# Patient Record
Sex: FEMALE | Race: WHITE | NOT HISPANIC OR LATINO | Employment: OTHER | URBAN - METROPOLITAN AREA
[De-identification: names, ages, dates, MRNs, and addresses within clinical notes are randomized per-mention and may not be internally consistent; named-entity substitution may affect disease eponyms.]

---

## 2018-09-19 ENCOUNTER — HISTORICAL (OUTPATIENT)
Dept: RADIATION THERAPY | Facility: HOSPITAL | Age: 63
End: 2018-09-19

## 2018-09-24 ENCOUNTER — HISTORICAL (OUTPATIENT)
Dept: RADIATION THERAPY | Facility: HOSPITAL | Age: 63
End: 2018-09-24

## 2018-09-25 ENCOUNTER — HISTORICAL (OUTPATIENT)
Dept: ADMINISTRATIVE | Facility: HOSPITAL | Age: 63
End: 2018-09-25

## 2018-09-25 LAB
ABS NEUT (OLG): 5 X10(3)/MCL (ref 2.1–9.2)
ALBUMIN SERPL-MCNC: 3.6 GM/DL (ref 3.4–5)
ALBUMIN/GLOB SERPL: 1 RATIO (ref 1–2)
ALP SERPL-CCNC: 95 UNIT/L (ref 45–117)
ALT SERPL-CCNC: 71 UNIT/L (ref 12–78)
AST SERPL-CCNC: 83 UNIT/L (ref 15–37)
BASOPHILS # BLD AUTO: 0.06 X10(3)/MCL
BASOPHILS NFR BLD AUTO: 1 %
BILIRUB SERPL-MCNC: 0.4 MG/DL (ref 0.2–1)
BILIRUBIN DIRECT+TOT PNL SERPL-MCNC: 0.1 MG/DL
BILIRUBIN DIRECT+TOT PNL SERPL-MCNC: 0.3 MG/DL
BUN SERPL-MCNC: 8 MG/DL (ref 7–18)
CALCIUM SERPL-MCNC: 9.7 MG/DL (ref 8.5–10.1)
CHLORIDE SERPL-SCNC: 104 MMOL/L (ref 98–107)
CO2 SERPL-SCNC: 25 MMOL/L (ref 21–32)
CREAT SERPL-MCNC: 0.9 MG/DL (ref 0.6–1.3)
EOSINOPHIL # BLD AUTO: 0.28 10*3/UL
EOSINOPHIL NFR BLD AUTO: 3 %
ERYTHROCYTE [DISTWIDTH] IN BLOOD BY AUTOMATED COUNT: 12.9 % (ref 11.5–14.5)
EST. AVERAGE GLUCOSE BLD GHB EST-MCNC: 154 MG/DL
GLOBULIN SER-MCNC: 4.5 GM/ML (ref 2.3–3.5)
GLUCOSE SERPL-MCNC: 209 MG/DL (ref 74–106)
HBA1C MFR BLD: 7 % (ref 4.2–6.3)
HCT VFR BLD AUTO: 41.4 % (ref 35–46)
HGB BLD-MCNC: 13.2 GM/DL (ref 12–16)
IMM GRANULOCYTES # BLD AUTO: 0.03 10*3/UL
IMM GRANULOCYTES NFR BLD AUTO: 0 %
LYMPHOCYTES # BLD AUTO: 3.16 X10(3)/MCL
LYMPHOCYTES NFR BLD AUTO: 34 % (ref 13–40)
MCH RBC QN AUTO: 30.1 PG (ref 26–34)
MCHC RBC AUTO-ENTMCNC: 31.9 GM/DL (ref 31–37)
MCV RBC AUTO: 94.3 FL (ref 80–100)
MONOCYTES # BLD AUTO: 0.71 X10(3)/MCL
MONOCYTES NFR BLD AUTO: 8 % (ref 4–12)
NEUTROPHILS # BLD AUTO: 5 X10(3)/MCL
NEUTROPHILS NFR BLD AUTO: 54 X10(3)/MCL
PLATELET # BLD AUTO: 216 X10(3)/MCL (ref 130–400)
PMV BLD AUTO: 10.8 FL (ref 7.4–10.4)
POTASSIUM SERPL-SCNC: 4 MMOL/L (ref 3.5–5.1)
PROT SERPL-MCNC: 8.1 GM/DL (ref 6.4–8.2)
RBC # BLD AUTO: 4.39 X10(6)/MCL (ref 4–5.2)
SODIUM SERPL-SCNC: 140 MMOL/L (ref 136–145)
WBC # SPEC AUTO: 9.2 X10(3)/MCL (ref 4.5–11)

## 2018-10-18 ENCOUNTER — HISTORICAL (OUTPATIENT)
Dept: RADIOLOGY | Facility: HOSPITAL | Age: 63
End: 2018-10-18

## 2018-10-23 ENCOUNTER — HISTORICAL (OUTPATIENT)
Dept: RADIATION THERAPY | Facility: HOSPITAL | Age: 63
End: 2018-10-23

## 2018-10-26 ENCOUNTER — HISTORICAL (OUTPATIENT)
Dept: INFUSION THERAPY | Facility: HOSPITAL | Age: 63
End: 2018-10-26

## 2018-10-26 LAB
ABS NEUT (OLG): 4.96 X10(3)/MCL (ref 2.1–9.2)
ALBUMIN SERPL-MCNC: 3.8 GM/DL (ref 3.4–5)
ALBUMIN/GLOB SERPL: 1 RATIO (ref 1–2)
ALP SERPL-CCNC: 89 UNIT/L (ref 45–117)
ALT SERPL-CCNC: 44 UNIT/L (ref 12–78)
AST SERPL-CCNC: 40 UNIT/L (ref 15–37)
BASOPHILS # BLD AUTO: 0.06 X10(3)/MCL
BASOPHILS NFR BLD AUTO: 1 %
BILIRUB SERPL-MCNC: 0.4 MG/DL (ref 0.2–1)
BILIRUBIN DIRECT+TOT PNL SERPL-MCNC: 0.2 MG/DL
BILIRUBIN DIRECT+TOT PNL SERPL-MCNC: 0.2 MG/DL
BUN SERPL-MCNC: 8 MG/DL (ref 7–18)
CALCIUM SERPL-MCNC: 9.9 MG/DL (ref 8.5–10.1)
CHLORIDE SERPL-SCNC: 102 MMOL/L (ref 98–107)
CO2 SERPL-SCNC: 22 MMOL/L (ref 21–32)
CREAT SERPL-MCNC: 0.8 MG/DL (ref 0.6–1.3)
EOSINOPHIL # BLD AUTO: 0.23 X10(3)/MCL
EOSINOPHIL NFR BLD AUTO: 2 %
ERYTHROCYTE [DISTWIDTH] IN BLOOD BY AUTOMATED COUNT: 13.2 % (ref 11.5–14.5)
GLOBULIN SER-MCNC: 4.3 GM/ML (ref 2.3–3.5)
GLUCOSE SERPL-MCNC: 179 MG/DL (ref 74–106)
HCT VFR BLD AUTO: 40 % (ref 35–46)
HGB BLD-MCNC: 12.9 GM/DL (ref 12–16)
IMM GRANULOCYTES # BLD AUTO: 0.05 10*3/UL
IMM GRANULOCYTES NFR BLD AUTO: 0 %
LYMPHOCYTES # BLD AUTO: 4.42 X10(3)/MCL
LYMPHOCYTES NFR BLD AUTO: 42 % (ref 13–40)
MCH RBC QN AUTO: 29.5 PG (ref 26–34)
MCHC RBC AUTO-ENTMCNC: 32.3 GM/DL (ref 31–37)
MCV RBC AUTO: 91.5 FL (ref 80–100)
MONOCYTES # BLD AUTO: 0.92 X10(3)/MCL
MONOCYTES NFR BLD AUTO: 9 % (ref 4–12)
NEUTROPHILS # BLD AUTO: 4.96 X10(3)/MCL
NEUTROPHILS NFR BLD AUTO: 47 X10(3)/MCL
PLATELET # BLD AUTO: 270 X10(3)/MCL (ref 130–400)
PMV BLD AUTO: 10.2 FL (ref 7.4–10.4)
POTASSIUM SERPL-SCNC: 3.6 MMOL/L (ref 3.5–5.1)
PROT SERPL-MCNC: 8.1 GM/DL (ref 6.4–8.2)
RBC # BLD AUTO: 4.37 X10(6)/MCL (ref 4–5.2)
SODIUM SERPL-SCNC: 138 MMOL/L (ref 136–145)
WBC # SPEC AUTO: 10.6 X10(3)/MCL (ref 4.5–11)

## 2018-10-31 ENCOUNTER — HISTORICAL (OUTPATIENT)
Dept: RADIATION THERAPY | Facility: HOSPITAL | Age: 63
End: 2018-10-31

## 2018-11-05 ENCOUNTER — HISTORICAL (OUTPATIENT)
Dept: HEMATOLOGY/ONCOLOGY | Facility: CLINIC | Age: 63
End: 2018-11-05

## 2018-11-06 ENCOUNTER — HISTORICAL (OUTPATIENT)
Dept: INFUSION THERAPY | Facility: HOSPITAL | Age: 63
End: 2018-11-06

## 2018-11-06 ENCOUNTER — HISTORICAL (OUTPATIENT)
Dept: RADIATION THERAPY | Facility: HOSPITAL | Age: 63
End: 2018-11-06

## 2018-11-06 LAB
ABS NEUT (OLG): 6.33 X10(3)/MCL (ref 2.1–9.2)
BASOPHILS # BLD AUTO: 0.06 X10(3)/MCL
BASOPHILS NFR BLD AUTO: 1 %
BUN SERPL-MCNC: 8 MG/DL (ref 7–18)
CALCIUM SERPL-MCNC: 9.3 MG/DL (ref 8.5–10.1)
CHLORIDE SERPL-SCNC: 102 MMOL/L (ref 98–107)
CO2 SERPL-SCNC: 23 MMOL/L (ref 21–32)
CREAT SERPL-MCNC: 0.9 MG/DL (ref 0.6–1.3)
CREAT/UREA NIT SERPL: 9
EOSINOPHIL # BLD AUTO: 0.24 X10(3)/MCL
EOSINOPHIL NFR BLD AUTO: 2 %
ERYTHROCYTE [DISTWIDTH] IN BLOOD BY AUTOMATED COUNT: 13.3 % (ref 11.5–14.5)
GLUCOSE SERPL-MCNC: 233 MG/DL (ref 74–106)
HCT VFR BLD AUTO: 38.5 % (ref 35–46)
HGB BLD-MCNC: 12.2 GM/DL (ref 12–16)
IMM GRANULOCYTES # BLD AUTO: 0.02 10*3/UL
IMM GRANULOCYTES NFR BLD AUTO: 0 %
LYMPHOCYTES # BLD AUTO: 3.39 X10(3)/MCL
LYMPHOCYTES NFR BLD AUTO: 32 % (ref 13–40)
MAGNESIUM SERPL-MCNC: 2 MG/DL (ref 1.8–2.4)
MCH RBC QN AUTO: 29.5 PG (ref 26–34)
MCHC RBC AUTO-ENTMCNC: 31.7 GM/DL (ref 31–37)
MCV RBC AUTO: 93.2 FL (ref 80–100)
MONOCYTES # BLD AUTO: 0.72 X10(3)/MCL
MONOCYTES NFR BLD AUTO: 7 % (ref 4–12)
NEUTROPHILS # BLD AUTO: 6.33 X10(3)/MCL
NEUTROPHILS NFR BLD AUTO: 59 X10(3)/MCL
PLATELET # BLD AUTO: 213 X10(3)/MCL (ref 130–400)
PMV BLD AUTO: 10.4 FL (ref 7.4–10.4)
POTASSIUM SERPL-SCNC: 3.5 MMOL/L (ref 3.5–5.1)
RBC # BLD AUTO: 4.13 X10(6)/MCL (ref 4–5.2)
SODIUM SERPL-SCNC: 135 MMOL/L (ref 136–145)
WBC # SPEC AUTO: 10.8 X10(3)/MCL (ref 4.5–11)

## 2018-11-07 ENCOUNTER — HISTORICAL (OUTPATIENT)
Dept: RADIATION THERAPY | Facility: HOSPITAL | Age: 63
End: 2018-11-07

## 2018-11-08 ENCOUNTER — HISTORICAL (OUTPATIENT)
Dept: RADIATION THERAPY | Facility: HOSPITAL | Age: 63
End: 2018-11-08

## 2018-11-09 ENCOUNTER — HISTORICAL (OUTPATIENT)
Dept: RADIATION THERAPY | Facility: HOSPITAL | Age: 63
End: 2018-11-09

## 2018-11-12 ENCOUNTER — HISTORICAL (OUTPATIENT)
Dept: RADIATION THERAPY | Facility: HOSPITAL | Age: 63
End: 2018-11-12

## 2018-11-13 ENCOUNTER — HISTORICAL (OUTPATIENT)
Dept: INFUSION THERAPY | Facility: HOSPITAL | Age: 63
End: 2018-11-13

## 2018-11-13 ENCOUNTER — HISTORICAL (OUTPATIENT)
Dept: RADIATION THERAPY | Facility: HOSPITAL | Age: 63
End: 2018-11-13

## 2018-11-13 LAB
ABS NEUT (OLG): 4.24 X10(3)/MCL (ref 2.1–9.2)
ALBUMIN SERPL-MCNC: 3.4 GM/DL (ref 3.4–5)
ALBUMIN/GLOB SERPL: 1 RATIO (ref 1–2)
ALP SERPL-CCNC: 81 UNIT/L (ref 45–117)
ALT SERPL-CCNC: 83 UNIT/L (ref 12–78)
AST SERPL-CCNC: 96 UNIT/L (ref 15–37)
BASOPHILS # BLD AUTO: 0.05 X10(3)/MCL
BASOPHILS NFR BLD AUTO: 1 %
BILIRUB SERPL-MCNC: 0.3 MG/DL (ref 0.2–1)
BILIRUBIN DIRECT+TOT PNL SERPL-MCNC: 0.1 MG/DL
BILIRUBIN DIRECT+TOT PNL SERPL-MCNC: 0.2 MG/DL
BUN SERPL-MCNC: 9 MG/DL (ref 7–18)
CALCIUM SERPL-MCNC: 9.2 MG/DL (ref 8.5–10.1)
CHLORIDE SERPL-SCNC: 104 MMOL/L (ref 98–107)
CO2 SERPL-SCNC: 26 MMOL/L (ref 21–32)
CREAT SERPL-MCNC: 0.7 MG/DL (ref 0.6–1.3)
EOSINOPHIL # BLD AUTO: 0.23 X10(3)/MCL
EOSINOPHIL NFR BLD AUTO: 3 %
ERYTHROCYTE [DISTWIDTH] IN BLOOD BY AUTOMATED COUNT: 13.2 % (ref 11.5–14.5)
GLOBULIN SER-MCNC: 4.1 GM/ML (ref 2.3–3.5)
GLUCOSE SERPL-MCNC: 193 MG/DL (ref 74–106)
HCT VFR BLD AUTO: 37.4 % (ref 35–46)
HGB BLD-MCNC: 12 GM/DL (ref 12–16)
IMM GRANULOCYTES # BLD AUTO: 0.01 10*3/UL
IMM GRANULOCYTES NFR BLD AUTO: 0 %
LYMPHOCYTES # BLD AUTO: 1.71 X10(3)/MCL
LYMPHOCYTES NFR BLD AUTO: 25 % (ref 13–40)
MAGNESIUM SERPL-MCNC: 1.6 MG/DL (ref 1.8–2.4)
MCH RBC QN AUTO: 29.3 PG (ref 26–34)
MCHC RBC AUTO-ENTMCNC: 32.1 GM/DL (ref 31–37)
MCV RBC AUTO: 91.2 FL (ref 80–100)
MONOCYTES # BLD AUTO: 0.63 X10(3)/MCL
MONOCYTES NFR BLD AUTO: 9 % (ref 4–12)
NEUTROPHILS # BLD AUTO: 4.24 X10(3)/MCL
NEUTROPHILS NFR BLD AUTO: 62 X10(3)/MCL
PLATELET # BLD AUTO: 221 X10(3)/MCL (ref 130–400)
PMV BLD AUTO: 10.8 FL (ref 7.4–10.4)
POTASSIUM SERPL-SCNC: 3.8 MMOL/L (ref 3.5–5.1)
PROT SERPL-MCNC: 7.5 GM/DL (ref 6.4–8.2)
RBC # BLD AUTO: 4.1 X10(6)/MCL (ref 4–5.2)
SODIUM SERPL-SCNC: 139 MMOL/L (ref 136–145)
WBC # SPEC AUTO: 6.9 X10(3)/MCL (ref 4.5–11)

## 2018-11-14 ENCOUNTER — HISTORICAL (OUTPATIENT)
Dept: RADIATION THERAPY | Facility: HOSPITAL | Age: 63
End: 2018-11-14

## 2018-11-15 ENCOUNTER — HISTORICAL (OUTPATIENT)
Dept: RADIATION THERAPY | Facility: HOSPITAL | Age: 63
End: 2018-11-15

## 2018-11-15 ENCOUNTER — HISTORICAL (OUTPATIENT)
Dept: INFUSION THERAPY | Facility: HOSPITAL | Age: 63
End: 2018-11-15

## 2018-11-16 ENCOUNTER — HISTORICAL (OUTPATIENT)
Dept: RADIATION THERAPY | Facility: HOSPITAL | Age: 63
End: 2018-11-16

## 2018-11-18 ENCOUNTER — HISTORICAL (OUTPATIENT)
Dept: RADIATION THERAPY | Facility: HOSPITAL | Age: 63
End: 2018-11-18

## 2018-11-19 ENCOUNTER — HISTORICAL (OUTPATIENT)
Dept: RADIATION THERAPY | Facility: HOSPITAL | Age: 63
End: 2018-11-19

## 2018-11-19 ENCOUNTER — HISTORICAL (OUTPATIENT)
Dept: ADMINISTRATIVE | Facility: HOSPITAL | Age: 63
End: 2018-11-19

## 2018-11-19 LAB
ABS NEUT (OLG): 5.23 X10(3)/MCL (ref 2.1–9.2)
BASOPHILS # BLD AUTO: 0.03 X10(3)/MCL
BASOPHILS NFR BLD AUTO: 0 %
BUN SERPL-MCNC: 8 MG/DL (ref 7–18)
CALCIUM SERPL-MCNC: 9.4 MG/DL (ref 8.5–10.1)
CHLORIDE SERPL-SCNC: 103 MMOL/L (ref 98–107)
CO2 SERPL-SCNC: 26 MMOL/L (ref 21–32)
CREAT SERPL-MCNC: 0.7 MG/DL (ref 0.6–1.3)
CREAT/UREA NIT SERPL: 11
EOSINOPHIL # BLD AUTO: 0.15 X10(3)/MCL
EOSINOPHIL NFR BLD AUTO: 2 %
ERYTHROCYTE [DISTWIDTH] IN BLOOD BY AUTOMATED COUNT: 13.3 % (ref 11.5–14.5)
GLUCOSE SERPL-MCNC: 152 MG/DL (ref 74–106)
HCT VFR BLD AUTO: 36.2 % (ref 35–46)
HGB BLD-MCNC: 11.6 GM/DL (ref 12–16)
IMM GRANULOCYTES # BLD AUTO: 0.02 10*3/UL
IMM GRANULOCYTES NFR BLD AUTO: 0 %
LYMPHOCYTES # BLD AUTO: 1.85 X10(3)/MCL
LYMPHOCYTES NFR BLD AUTO: 23 % (ref 13–40)
MAGNESIUM SERPL-MCNC: 1.8 MG/DL (ref 1.8–2.4)
MCH RBC QN AUTO: 29.1 PG (ref 26–34)
MCHC RBC AUTO-ENTMCNC: 32 GM/DL (ref 31–37)
MCV RBC AUTO: 90.7 FL (ref 80–100)
MONOCYTES # BLD AUTO: 0.81 X10(3)/MCL
MONOCYTES NFR BLD AUTO: 10 % (ref 4–12)
NEUTROPHILS # BLD AUTO: 5.23 X10(3)/MCL
NEUTROPHILS NFR BLD AUTO: 65 X10(3)/MCL
PLATELET # BLD AUTO: 197 X10(3)/MCL (ref 130–400)
PMV BLD AUTO: 9.8 FL (ref 7.4–10.4)
POTASSIUM SERPL-SCNC: 3.6 MMOL/L (ref 3.5–5.1)
RBC # BLD AUTO: 3.99 X10(6)/MCL (ref 4–5.2)
SODIUM SERPL-SCNC: 139 MMOL/L (ref 136–145)
WBC # SPEC AUTO: 8.1 X10(3)/MCL (ref 4.5–11)

## 2018-11-20 ENCOUNTER — HISTORICAL (OUTPATIENT)
Dept: RADIATION THERAPY | Facility: HOSPITAL | Age: 63
End: 2018-11-20

## 2018-11-20 ENCOUNTER — HISTORICAL (OUTPATIENT)
Dept: INFUSION THERAPY | Facility: HOSPITAL | Age: 63
End: 2018-11-20

## 2018-11-21 ENCOUNTER — HISTORICAL (OUTPATIENT)
Dept: RADIATION THERAPY | Facility: HOSPITAL | Age: 63
End: 2018-11-21

## 2018-11-26 ENCOUNTER — HISTORICAL (OUTPATIENT)
Dept: RADIATION THERAPY | Facility: HOSPITAL | Age: 63
End: 2018-11-26

## 2018-11-27 ENCOUNTER — HISTORICAL (OUTPATIENT)
Dept: INFUSION THERAPY | Facility: HOSPITAL | Age: 63
End: 2018-11-27

## 2018-11-27 ENCOUNTER — HISTORICAL (OUTPATIENT)
Dept: RADIATION THERAPY | Facility: HOSPITAL | Age: 63
End: 2018-11-27

## 2018-11-27 LAB
ABS NEUT (OLG): 6.84 X10(3)/MCL (ref 2.1–9.2)
ALBUMIN SERPL-MCNC: 3.6 GM/DL (ref 3.4–5)
ALBUMIN/GLOB SERPL: 1 RATIO (ref 1–2)
ALP SERPL-CCNC: 90 UNIT/L (ref 45–117)
ALT SERPL-CCNC: 59 UNIT/L (ref 12–78)
AST SERPL-CCNC: 59 UNIT/L (ref 15–37)
BASOPHILS # BLD AUTO: 0.06 X10(3)/MCL
BASOPHILS NFR BLD AUTO: 1 %
BILIRUB SERPL-MCNC: 0.5 MG/DL (ref 0.2–1)
BILIRUBIN DIRECT+TOT PNL SERPL-MCNC: 0.2 MG/DL
BILIRUBIN DIRECT+TOT PNL SERPL-MCNC: 0.3 MG/DL
BUN SERPL-MCNC: 7 MG/DL (ref 7–18)
CALCIUM SERPL-MCNC: 9.6 MG/DL (ref 8.5–10.1)
CHLORIDE SERPL-SCNC: 102 MMOL/L (ref 98–107)
CO2 SERPL-SCNC: 28 MMOL/L (ref 21–32)
CREAT SERPL-MCNC: 0.7 MG/DL (ref 0.6–1.3)
EOSINOPHIL # BLD AUTO: 0.32 10*3/UL
EOSINOPHIL NFR BLD AUTO: 3 %
ERYTHROCYTE [DISTWIDTH] IN BLOOD BY AUTOMATED COUNT: 13.4 % (ref 11.5–14.5)
GLOBULIN SER-MCNC: 4.3 GM/ML (ref 2.3–3.5)
GLUCOSE SERPL-MCNC: 169 MG/DL (ref 74–106)
HCT VFR BLD AUTO: 41.5 % (ref 35–46)
HGB BLD-MCNC: 13 GM/DL (ref 12–16)
IMM GRANULOCYTES # BLD AUTO: 0.03 10*3/UL
IMM GRANULOCYTES NFR BLD AUTO: 0 %
LYMPHOCYTES # BLD AUTO: 1.67 X10(3)/MCL
LYMPHOCYTES NFR BLD AUTO: 17 % (ref 13–40)
MAGNESIUM SERPL-MCNC: 1.8 MG/DL (ref 1.8–2.4)
MCH RBC QN AUTO: 29.1 PG (ref 26–34)
MCHC RBC AUTO-ENTMCNC: 31.3 GM/DL (ref 31–37)
MCV RBC AUTO: 92.8 FL (ref 80–100)
MONOCYTES # BLD AUTO: 0.81 X10(3)/MCL
MONOCYTES NFR BLD AUTO: 8 % (ref 4–12)
NEUTROPHILS # BLD AUTO: 6.84 X10(3)/MCL
NEUTROPHILS NFR BLD AUTO: 70 X10(3)/MCL
PLATELET # BLD AUTO: 253 X10(3)/MCL (ref 130–400)
PMV BLD AUTO: 10.1 FL (ref 7.4–10.4)
POTASSIUM SERPL-SCNC: 4 MMOL/L (ref 3.5–5.1)
PROT SERPL-MCNC: 7.9 GM/DL (ref 6.4–8.2)
RBC # BLD AUTO: 4.47 X10(6)/MCL (ref 4–5.2)
SODIUM SERPL-SCNC: 138 MMOL/L (ref 136–145)
WBC # SPEC AUTO: 9.7 X10(3)/MCL (ref 4.5–11)

## 2018-11-28 ENCOUNTER — HISTORICAL (OUTPATIENT)
Dept: RADIATION THERAPY | Facility: HOSPITAL | Age: 63
End: 2018-11-28

## 2018-11-29 ENCOUNTER — HISTORICAL (OUTPATIENT)
Dept: RADIATION THERAPY | Facility: HOSPITAL | Age: 63
End: 2018-11-29

## 2018-11-30 ENCOUNTER — HISTORICAL (OUTPATIENT)
Dept: RADIATION THERAPY | Facility: HOSPITAL | Age: 63
End: 2018-11-30

## 2018-12-03 ENCOUNTER — HISTORICAL (OUTPATIENT)
Dept: RADIATION THERAPY | Facility: HOSPITAL | Age: 63
End: 2018-12-03

## 2018-12-04 ENCOUNTER — HISTORICAL (OUTPATIENT)
Dept: RADIATION THERAPY | Facility: HOSPITAL | Age: 63
End: 2018-12-04

## 2018-12-04 ENCOUNTER — HISTORICAL (OUTPATIENT)
Dept: INFUSION THERAPY | Facility: HOSPITAL | Age: 63
End: 2018-12-04

## 2018-12-04 LAB
ABS NEUT (OLG): 7.68 X10(3)/MCL (ref 2.1–9.2)
ALBUMIN SERPL-MCNC: 3.7 GM/DL (ref 3.4–5)
ALBUMIN/GLOB SERPL: 1 RATIO (ref 1–2)
ALP SERPL-CCNC: 89 UNIT/L (ref 45–117)
ALT SERPL-CCNC: 46 UNIT/L (ref 12–78)
AST SERPL-CCNC: 34 UNIT/L (ref 15–37)
BASOPHILS # BLD AUTO: 0.04 X10(3)/MCL
BASOPHILS NFR BLD AUTO: 0 %
BILIRUB SERPL-MCNC: 0.5 MG/DL (ref 0.2–1)
BILIRUBIN DIRECT+TOT PNL SERPL-MCNC: 0.2 MG/DL
BILIRUBIN DIRECT+TOT PNL SERPL-MCNC: 0.3 MG/DL
BUN SERPL-MCNC: 16 MG/DL (ref 7–18)
CALCIUM SERPL-MCNC: 9.7 MG/DL (ref 8.5–10.1)
CHLORIDE SERPL-SCNC: 102 MMOL/L (ref 98–107)
CO2 SERPL-SCNC: 25 MMOL/L (ref 21–32)
CREAT SERPL-MCNC: 0.9 MG/DL (ref 0.6–1.3)
EOSINOPHIL # BLD AUTO: 0.08 10*3/UL
EOSINOPHIL NFR BLD AUTO: 1 %
ERYTHROCYTE [DISTWIDTH] IN BLOOD BY AUTOMATED COUNT: 13.2 % (ref 11.5–14.5)
GLOBULIN SER-MCNC: 4.7 GM/ML (ref 2.3–3.5)
GLUCOSE SERPL-MCNC: 223 MG/DL (ref 74–106)
HCT VFR BLD AUTO: 39.5 % (ref 35–46)
HGB BLD-MCNC: 12.8 GM/DL (ref 12–16)
IMM GRANULOCYTES # BLD AUTO: 0.04 10*3/UL
IMM GRANULOCYTES NFR BLD AUTO: 0 %
LYMPHOCYTES # BLD AUTO: 0.63 X10(3)/MCL
LYMPHOCYTES NFR BLD AUTO: 7 % (ref 13–40)
MAGNESIUM SERPL-MCNC: 1.8 MG/DL (ref 1.8–2.4)
MCH RBC QN AUTO: 29.6 PG (ref 26–34)
MCHC RBC AUTO-ENTMCNC: 32.4 GM/DL (ref 31–37)
MCV RBC AUTO: 91.2 FL (ref 80–100)
MONOCYTES # BLD AUTO: 0.59 X10(3)/MCL
MONOCYTES NFR BLD AUTO: 6 % (ref 4–12)
NEUTROPHILS # BLD AUTO: 7.68 X10(3)/MCL
NEUTROPHILS NFR BLD AUTO: 85 X10(3)/MCL
PLATELET # BLD AUTO: 204 X10(3)/MCL (ref 130–400)
PMV BLD AUTO: 10.3 FL (ref 7.4–10.4)
POTASSIUM SERPL-SCNC: 3.8 MMOL/L (ref 3.5–5.1)
PROT SERPL-MCNC: 8.4 GM/DL (ref 6.4–8.2)
RBC # BLD AUTO: 4.33 X10(6)/MCL (ref 4–5.2)
SODIUM SERPL-SCNC: 137 MMOL/L (ref 136–145)
WBC # SPEC AUTO: 9.1 X10(3)/MCL (ref 4.5–11)

## 2018-12-05 ENCOUNTER — HISTORICAL (OUTPATIENT)
Dept: RADIATION THERAPY | Facility: HOSPITAL | Age: 63
End: 2018-12-05

## 2018-12-06 ENCOUNTER — HISTORICAL (OUTPATIENT)
Dept: RADIATION THERAPY | Facility: HOSPITAL | Age: 63
End: 2018-12-06

## 2018-12-07 ENCOUNTER — HISTORICAL (OUTPATIENT)
Dept: RADIATION THERAPY | Facility: HOSPITAL | Age: 63
End: 2018-12-07

## 2018-12-10 ENCOUNTER — HISTORICAL (OUTPATIENT)
Dept: RADIATION THERAPY | Facility: HOSPITAL | Age: 63
End: 2018-12-10

## 2018-12-11 ENCOUNTER — HISTORICAL (OUTPATIENT)
Dept: RADIATION THERAPY | Facility: HOSPITAL | Age: 63
End: 2018-12-11

## 2018-12-11 ENCOUNTER — HISTORICAL (OUTPATIENT)
Dept: INFUSION THERAPY | Facility: HOSPITAL | Age: 63
End: 2018-12-11

## 2018-12-11 LAB
ABS NEUT (OLG): 7.24 X10(3)/MCL (ref 2.1–9.2)
ALBUMIN SERPL-MCNC: 3.2 GM/DL (ref 3.4–5)
ALBUMIN/GLOB SERPL: 1 RATIO (ref 1–2)
ALP SERPL-CCNC: 86 UNIT/L (ref 45–117)
ALT SERPL-CCNC: 32 UNIT/L (ref 12–78)
AST SERPL-CCNC: 29 UNIT/L (ref 15–37)
BASOPHILS # BLD AUTO: 0.04 X10(3)/MCL
BASOPHILS NFR BLD AUTO: 0 %
BILIRUB SERPL-MCNC: 0.5 MG/DL (ref 0.2–1)
BILIRUBIN DIRECT+TOT PNL SERPL-MCNC: 0.2 MG/DL
BILIRUBIN DIRECT+TOT PNL SERPL-MCNC: 0.3 MG/DL
BUN SERPL-MCNC: 10 MG/DL (ref 7–18)
CALCIUM SERPL-MCNC: 8.7 MG/DL (ref 8.5–10.1)
CHLORIDE SERPL-SCNC: 102 MMOL/L (ref 98–107)
CO2 SERPL-SCNC: 26 MMOL/L (ref 21–32)
CREAT SERPL-MCNC: 2 MG/DL (ref 0.6–1.3)
EOSINOPHIL # BLD AUTO: 0.16 X10(3)/MCL
EOSINOPHIL NFR BLD AUTO: 2 %
ERYTHROCYTE [DISTWIDTH] IN BLOOD BY AUTOMATED COUNT: 13.8 % (ref 11.5–14.5)
GLOBULIN SER-MCNC: 4.4 GM/ML (ref 2.3–3.5)
GLUCOSE SERPL-MCNC: 186 MG/DL (ref 74–106)
HCT VFR BLD AUTO: 38.8 % (ref 35–46)
HGB BLD-MCNC: 12.2 GM/DL (ref 12–16)
IMM GRANULOCYTES # BLD AUTO: 0.03 10*3/UL
IMM GRANULOCYTES NFR BLD AUTO: 0 %
LYMPHOCYTES # BLD AUTO: 0.53 X10(3)/MCL
LYMPHOCYTES NFR BLD AUTO: 6 % (ref 13–40)
MAGNESIUM SERPL-MCNC: 1.6 MG/DL (ref 1.8–2.4)
MCH RBC QN AUTO: 28.8 PG (ref 26–34)
MCHC RBC AUTO-ENTMCNC: 31.4 GM/DL (ref 31–37)
MCV RBC AUTO: 91.7 FL (ref 80–100)
MONOCYTES # BLD AUTO: 0.85 X10(3)/MCL
MONOCYTES NFR BLD AUTO: 10 % (ref 4–12)
NEUTROPHILS # BLD AUTO: 7.24 X10(3)/MCL
NEUTROPHILS NFR BLD AUTO: 82 X10(3)/MCL
PLATELET # BLD AUTO: 231 X10(3)/MCL (ref 130–400)
PMV BLD AUTO: 9.9 FL (ref 7.4–10.4)
POTASSIUM SERPL-SCNC: 3.4 MMOL/L (ref 3.5–5.1)
PROT SERPL-MCNC: 7.6 GM/DL (ref 6.4–8.2)
RBC # BLD AUTO: 4.23 X10(6)/MCL (ref 4–5.2)
SODIUM SERPL-SCNC: 138 MMOL/L (ref 136–145)
WBC # SPEC AUTO: 8.8 X10(3)/MCL (ref 4.5–11)

## 2018-12-12 ENCOUNTER — HISTORICAL (OUTPATIENT)
Dept: RADIATION THERAPY | Facility: HOSPITAL | Age: 63
End: 2018-12-12

## 2018-12-12 ENCOUNTER — HISTORICAL (OUTPATIENT)
Dept: INFUSION THERAPY | Facility: HOSPITAL | Age: 63
End: 2018-12-12

## 2018-12-13 ENCOUNTER — HISTORICAL (OUTPATIENT)
Dept: RADIATION THERAPY | Facility: HOSPITAL | Age: 63
End: 2018-12-13

## 2018-12-14 ENCOUNTER — HISTORICAL (OUTPATIENT)
Dept: RADIATION THERAPY | Facility: HOSPITAL | Age: 63
End: 2018-12-14

## 2018-12-17 ENCOUNTER — HISTORICAL (OUTPATIENT)
Dept: RADIATION THERAPY | Facility: HOSPITAL | Age: 63
End: 2018-12-17

## 2018-12-18 ENCOUNTER — HISTORICAL (OUTPATIENT)
Dept: RADIATION THERAPY | Facility: HOSPITAL | Age: 63
End: 2018-12-18

## 2018-12-19 ENCOUNTER — HISTORICAL (OUTPATIENT)
Dept: INFUSION THERAPY | Facility: HOSPITAL | Age: 63
End: 2018-12-19

## 2018-12-19 ENCOUNTER — HISTORICAL (OUTPATIENT)
Dept: RADIATION THERAPY | Facility: HOSPITAL | Age: 63
End: 2018-12-19

## 2018-12-19 LAB
ABS NEUT (OLG): 4.08 X10(3)/MCL (ref 2.1–9.2)
ALBUMIN SERPL-MCNC: 2.8 GM/DL (ref 3.4–5)
ALBUMIN/GLOB SERPL: 1 RATIO (ref 1–2)
ALP SERPL-CCNC: 84 UNIT/L (ref 45–117)
ALT SERPL-CCNC: 29 UNIT/L (ref 12–78)
AST SERPL-CCNC: 30 UNIT/L (ref 15–37)
BASOPHILS # BLD AUTO: 0.03 X10(3)/MCL
BASOPHILS NFR BLD AUTO: 0 %
BILIRUB SERPL-MCNC: 0.6 MG/DL (ref 0.2–1)
BILIRUBIN DIRECT+TOT PNL SERPL-MCNC: 0.3 MG/DL
BILIRUBIN DIRECT+TOT PNL SERPL-MCNC: 0.3 MG/DL
BUN SERPL-MCNC: 4 MG/DL (ref 7–18)
CALCIUM SERPL-MCNC: 7.9 MG/DL (ref 8.5–10.1)
CHLORIDE SERPL-SCNC: 99 MMOL/L (ref 98–107)
CO2 SERPL-SCNC: 30 MMOL/L (ref 21–32)
CREAT SERPL-MCNC: 1 MG/DL (ref 0.6–1.3)
EOSINOPHIL # BLD AUTO: 0.21 X10(3)/MCL
EOSINOPHIL NFR BLD AUTO: 4 %
ERYTHROCYTE [DISTWIDTH] IN BLOOD BY AUTOMATED COUNT: 13.7 % (ref 11.5–14.5)
GLOBULIN SER-MCNC: 4.4 GM/ML (ref 2.3–3.5)
GLUCOSE SERPL-MCNC: 204 MG/DL (ref 74–106)
HCT VFR BLD AUTO: 36.5 % (ref 35–46)
HGB BLD-MCNC: 11.6 GM/DL (ref 12–16)
IMM GRANULOCYTES # BLD AUTO: 0.02 10*3/UL
IMM GRANULOCYTES NFR BLD AUTO: 0 %
LYMPHOCYTES # BLD AUTO: 0.59 X10(3)/MCL
LYMPHOCYTES NFR BLD AUTO: 11 % (ref 13–40)
MAGNESIUM SERPL-MCNC: 1.4 MG/DL (ref 1.8–2.4)
MCH RBC QN AUTO: 28.8 PG (ref 26–34)
MCHC RBC AUTO-ENTMCNC: 31.8 GM/DL (ref 31–37)
MCV RBC AUTO: 90.6 FL (ref 80–100)
MONOCYTES # BLD AUTO: 0.53 X10(3)/MCL
MONOCYTES NFR BLD AUTO: 10 % (ref 4–12)
NEUTROPHILS # BLD AUTO: 4.08 X10(3)/MCL
NEUTROPHILS NFR BLD AUTO: 75 X10(3)/MCL
PLATELET # BLD AUTO: 244 X10(3)/MCL (ref 130–400)
PMV BLD AUTO: 9.6 FL (ref 7.4–10.4)
POTASSIUM SERPL-SCNC: 2.9 MMOL/L (ref 3.5–5.1)
PROT SERPL-MCNC: 7.2 GM/DL (ref 6.4–8.2)
RBC # BLD AUTO: 4.03 X10(6)/MCL (ref 4–5.2)
SODIUM SERPL-SCNC: 138 MMOL/L (ref 136–145)
WBC # SPEC AUTO: 5.5 X10(3)/MCL (ref 4.5–11)

## 2018-12-20 ENCOUNTER — HISTORICAL (OUTPATIENT)
Dept: RADIATION THERAPY | Facility: HOSPITAL | Age: 63
End: 2018-12-20

## 2018-12-21 ENCOUNTER — HISTORICAL (OUTPATIENT)
Dept: RADIATION THERAPY | Facility: HOSPITAL | Age: 63
End: 2018-12-21

## 2018-12-24 ENCOUNTER — HISTORICAL (OUTPATIENT)
Dept: INFUSION THERAPY | Facility: HOSPITAL | Age: 63
End: 2018-12-24

## 2018-12-24 LAB
ABS NEUT (OLG): 3.84 X10(3)/MCL (ref 2.1–9.2)
ALBUMIN SERPL-MCNC: 2.6 GM/DL (ref 3.4–5)
ALBUMIN/GLOB SERPL: 1 RATIO (ref 1–2)
ALP SERPL-CCNC: 84 UNIT/L (ref 45–117)
ALT SERPL-CCNC: 24 UNIT/L (ref 12–78)
AST SERPL-CCNC: 19 UNIT/L (ref 15–37)
BASOPHILS # BLD AUTO: 0.02 X10(3)/MCL
BASOPHILS NFR BLD AUTO: 0 %
BILIRUB SERPL-MCNC: 0.6 MG/DL (ref 0.2–1)
BILIRUBIN DIRECT+TOT PNL SERPL-MCNC: 0.3 MG/DL
BILIRUBIN DIRECT+TOT PNL SERPL-MCNC: 0.3 MG/DL
BUN SERPL-MCNC: 2 MG/DL (ref 7–18)
CALCIUM SERPL-MCNC: 8.2 MG/DL (ref 8.5–10.1)
CHLORIDE SERPL-SCNC: 101 MMOL/L (ref 98–107)
CO2 SERPL-SCNC: 29 MMOL/L (ref 21–32)
CREAT SERPL-MCNC: 0.7 MG/DL (ref 0.6–1.3)
EOSINOPHIL # BLD AUTO: 0.1 X10(3)/MCL
EOSINOPHIL NFR BLD AUTO: 2 %
ERYTHROCYTE [DISTWIDTH] IN BLOOD BY AUTOMATED COUNT: 13.7 % (ref 11.5–14.5)
GLOBULIN SER-MCNC: 4.5 GM/ML (ref 2.3–3.5)
GLUCOSE SERPL-MCNC: 211 MG/DL (ref 74–106)
HCT VFR BLD AUTO: 36.1 % (ref 35–46)
HGB BLD-MCNC: 11.6 GM/DL (ref 12–16)
IMM GRANULOCYTES # BLD AUTO: 0.02 10*3/UL
IMM GRANULOCYTES NFR BLD AUTO: 0 %
LYMPHOCYTES # BLD AUTO: 0.6 X10(3)/MCL
LYMPHOCYTES NFR BLD AUTO: 12 % (ref 13–40)
MAGNESIUM SERPL-MCNC: 1.4 MG/DL (ref 1.8–2.4)
MCH RBC QN AUTO: 28.4 PG (ref 26–34)
MCHC RBC AUTO-ENTMCNC: 32.1 GM/DL (ref 31–37)
MCV RBC AUTO: 88.3 FL (ref 80–100)
MONOCYTES # BLD AUTO: 0.57 X10(3)/MCL
MONOCYTES NFR BLD AUTO: 11 % (ref 4–12)
NEUTROPHILS # BLD AUTO: 3.84 X10(3)/MCL
NEUTROPHILS NFR BLD AUTO: 74 X10(3)/MCL
PLATELET # BLD AUTO: 249 X10(3)/MCL (ref 130–400)
PMV BLD AUTO: 9.6 FL (ref 7.4–10.4)
POTASSIUM SERPL-SCNC: 2.7 MMOL/L (ref 3.5–5.1)
PROT SERPL-MCNC: 7.1 GM/DL (ref 6.4–8.2)
RBC # BLD AUTO: 4.09 X10(6)/MCL (ref 4–5.2)
SODIUM SERPL-SCNC: 137 MMOL/L (ref 136–145)
WBC # SPEC AUTO: 5.2 X10(3)/MCL (ref 4.5–11)

## 2019-01-04 ENCOUNTER — HISTORICAL (OUTPATIENT)
Dept: RADIATION THERAPY | Facility: HOSPITAL | Age: 64
End: 2019-01-04

## 2019-01-14 ENCOUNTER — HISTORICAL (OUTPATIENT)
Dept: RADIATION THERAPY | Facility: HOSPITAL | Age: 64
End: 2019-01-14

## 2019-01-23 ENCOUNTER — HISTORICAL (OUTPATIENT)
Dept: RADIATION THERAPY | Facility: HOSPITAL | Age: 64
End: 2019-01-23

## 2019-02-12 ENCOUNTER — HISTORICAL (OUTPATIENT)
Dept: ADMINISTRATIVE | Facility: HOSPITAL | Age: 64
End: 2019-02-12

## 2019-02-12 LAB
ABS NEUT (OLG): 3.77 X10(3)/MCL (ref 2.1–9.2)
ALBUMIN SERPL-MCNC: 2.8 GM/DL (ref 3.4–5)
ALBUMIN/GLOB SERPL: 0.6 RATIO (ref 1.1–2)
ALP SERPL-CCNC: 116 UNIT/L (ref 45–117)
ALT SERPL-CCNC: 13 UNIT/L (ref 12–78)
AST SERPL-CCNC: 22 UNIT/L (ref 15–37)
BASOPHILS # BLD AUTO: 0.02 X10(3)/MCL
BASOPHILS NFR BLD AUTO: 0 %
BILIRUB SERPL-MCNC: 0.3 MG/DL (ref 0.2–1)
BILIRUBIN DIRECT+TOT PNL SERPL-MCNC: 0.1 MG/DL
BILIRUBIN DIRECT+TOT PNL SERPL-MCNC: 0.2 MG/DL
BUN SERPL-MCNC: 3 MG/DL (ref 7–18)
CALCIUM SERPL-MCNC: 9.2 MG/DL (ref 8.5–10.1)
CHLORIDE SERPL-SCNC: 104 MMOL/L (ref 98–107)
CO2 SERPL-SCNC: 28 MMOL/L (ref 21–32)
CREAT SERPL-MCNC: 0.7 MG/DL (ref 0.6–1.3)
EOSINOPHIL # BLD AUTO: 0.13 X10(3)/MCL
EOSINOPHIL NFR BLD AUTO: 2 %
ERYTHROCYTE [DISTWIDTH] IN BLOOD BY AUTOMATED COUNT: 16 % (ref 11.5–14.5)
GLOBULIN SER-MCNC: 4.8 GM/ML (ref 2.3–3.5)
GLUCOSE SERPL-MCNC: 159 MG/DL (ref 74–106)
HCT VFR BLD AUTO: 36.9 % (ref 35–46)
HGB BLD-MCNC: 11.6 GM/DL (ref 12–16)
IMM GRANULOCYTES # BLD AUTO: 0.02 10*3/UL
IMM GRANULOCYTES NFR BLD AUTO: 0 %
LYMPHOCYTES # BLD AUTO: 2.21 X10(3)/MCL
LYMPHOCYTES NFR BLD AUTO: 33 % (ref 13–40)
MCH RBC QN AUTO: 29.1 PG (ref 26–34)
MCHC RBC AUTO-ENTMCNC: 31.4 GM/DL (ref 31–37)
MCV RBC AUTO: 92.5 FL (ref 80–100)
MONOCYTES # BLD AUTO: 0.54 X10(3)/MCL
MONOCYTES NFR BLD AUTO: 8 % (ref 4–12)
NEUTROPHILS # BLD AUTO: 3.77 X10(3)/MCL
NEUTROPHILS NFR BLD AUTO: 56 X10(3)/MCL
PLATELET # BLD AUTO: 268 X10(3)/MCL (ref 130–400)
PMV BLD AUTO: 9.5 FL (ref 7.4–10.4)
POTASSIUM SERPL-SCNC: 4.1 MMOL/L (ref 3.5–5.1)
PROT SERPL-MCNC: 7.6 GM/DL (ref 6.4–8.2)
RBC # BLD AUTO: 3.99 X10(6)/MCL (ref 4–5.2)
SODIUM SERPL-SCNC: 137 MMOL/L (ref 136–145)
WBC # SPEC AUTO: 6.7 X10(3)/MCL (ref 4.5–11)

## 2019-02-20 ENCOUNTER — HISTORICAL (OUTPATIENT)
Dept: RADIATION THERAPY | Facility: HOSPITAL | Age: 64
End: 2019-02-20

## 2019-03-12 ENCOUNTER — HISTORICAL (OUTPATIENT)
Dept: RADIOLOGY | Facility: HOSPITAL | Age: 64
End: 2019-03-12

## 2019-03-26 ENCOUNTER — HISTORICAL (OUTPATIENT)
Dept: ADMINISTRATIVE | Facility: HOSPITAL | Age: 64
End: 2019-03-26

## 2019-03-26 LAB
ABS NEUT (OLG): 4.1 X10(3)/MCL (ref 2.1–9.2)
ALBUMIN SERPL-MCNC: 3.3 GM/DL (ref 3.4–5)
ALBUMIN/GLOB SERPL: 0.8 RATIO (ref 1.1–2)
ALP SERPL-CCNC: 82 UNIT/L (ref 45–117)
ALT SERPL-CCNC: 18 UNIT/L (ref 12–78)
AST SERPL-CCNC: 23 UNIT/L (ref 15–37)
BASOPHILS # BLD AUTO: 0.03 X10(3)/MCL
BASOPHILS NFR BLD AUTO: 0 %
BILIRUB SERPL-MCNC: 0.4 MG/DL (ref 0.2–1)
BILIRUBIN DIRECT+TOT PNL SERPL-MCNC: 0.1 MG/DL
BILIRUBIN DIRECT+TOT PNL SERPL-MCNC: 0.3 MG/DL
BUN SERPL-MCNC: 6 MG/DL (ref 7–18)
CALCIUM SERPL-MCNC: 9.5 MG/DL (ref 8.5–10.1)
CHLORIDE SERPL-SCNC: 104 MMOL/L (ref 98–107)
CO2 SERPL-SCNC: 27 MMOL/L (ref 21–32)
CREAT SERPL-MCNC: 1 MG/DL (ref 0.6–1.3)
EOSINOPHIL # BLD AUTO: 0.13 X10(3)/MCL
EOSINOPHIL NFR BLD AUTO: 2 %
ERYTHROCYTE [DISTWIDTH] IN BLOOD BY AUTOMATED COUNT: 13.2 % (ref 11.5–14.5)
GLOBULIN SER-MCNC: 4 GM/ML (ref 2.3–3.5)
GLUCOSE SERPL-MCNC: 276 MG/DL (ref 74–106)
HCT VFR BLD AUTO: 37.7 % (ref 35–46)
HGB BLD-MCNC: 12.1 GM/DL (ref 12–16)
IMM GRANULOCYTES # BLD AUTO: 0.03 10*3/UL
IMM GRANULOCYTES NFR BLD AUTO: 0 %
LYMPHOCYTES # BLD AUTO: 1.6 X10(3)/MCL
LYMPHOCYTES NFR BLD AUTO: 25 % (ref 13–40)
MCH RBC QN AUTO: 29.9 PG (ref 26–34)
MCHC RBC AUTO-ENTMCNC: 32.1 GM/DL (ref 31–37)
MCV RBC AUTO: 93.1 FL (ref 80–100)
MONOCYTES # BLD AUTO: 0.51 X10(3)/MCL
MONOCYTES NFR BLD AUTO: 8 % (ref 4–12)
NEUTROPHILS # BLD AUTO: 4.1 X10(3)/MCL
NEUTROPHILS NFR BLD AUTO: 64 X10(3)/MCL
PLATELET # BLD AUTO: 201 X10(3)/MCL (ref 130–400)
PMV BLD AUTO: 9.7 FL (ref 7.4–10.4)
POTASSIUM SERPL-SCNC: 3.8 MMOL/L (ref 3.5–5.1)
PROT SERPL-MCNC: 7.3 GM/DL (ref 6.4–8.2)
RBC # BLD AUTO: 4.05 X10(6)/MCL (ref 4–5.2)
SODIUM SERPL-SCNC: 136 MMOL/L (ref 136–145)
WBC # SPEC AUTO: 6.4 X10(3)/MCL (ref 4.5–11)

## 2019-04-02 ENCOUNTER — HISTORICAL (OUTPATIENT)
Dept: RADIATION THERAPY | Facility: HOSPITAL | Age: 64
End: 2019-04-02

## 2019-07-02 ENCOUNTER — HISTORICAL (OUTPATIENT)
Dept: RADIATION THERAPY | Facility: HOSPITAL | Age: 64
End: 2019-07-02

## 2019-11-20 ENCOUNTER — HISTORICAL (OUTPATIENT)
Dept: RADIATION THERAPY | Facility: HOSPITAL | Age: 64
End: 2019-11-20

## 2019-12-04 ENCOUNTER — HISTORICAL (OUTPATIENT)
Dept: ADMINISTRATIVE | Facility: HOSPITAL | Age: 64
End: 2019-12-04

## 2020-02-26 ENCOUNTER — HISTORICAL (OUTPATIENT)
Dept: RADIOLOGY | Facility: HOSPITAL | Age: 65
End: 2020-02-26

## 2020-02-26 ENCOUNTER — HISTORICAL (OUTPATIENT)
Dept: ADMINISTRATIVE | Facility: HOSPITAL | Age: 65
End: 2020-02-26

## 2020-02-26 LAB
BUN SERPL-MCNC: 5 MG/DL (ref 7–18)
CALCIUM SERPL-MCNC: 9.6 MG/DL (ref 8.5–10.1)
CHLORIDE SERPL-SCNC: 108 MMOL/L (ref 98–107)
CO2 SERPL-SCNC: 29 MMOL/L (ref 21–32)
CREAT SERPL-MCNC: 0.7 MG/DL (ref 0.6–1.3)
CREAT/UREA NIT SERPL: 7
GLUCOSE SERPL-MCNC: 174 MG/DL (ref 74–106)
POTASSIUM SERPL-SCNC: 4.4 MMOL/L (ref 3.5–5.1)
SODIUM SERPL-SCNC: 140 MMOL/L (ref 136–145)

## 2020-06-03 ENCOUNTER — HISTORICAL (OUTPATIENT)
Dept: ADMINISTRATIVE | Facility: HOSPITAL | Age: 65
End: 2020-06-03

## 2020-06-03 LAB
ABS NEUT (OLG): 5.89 X10(3)/MCL (ref 2.1–9.2)
BASOPHILS # BLD AUTO: 0 X10(3)/MCL (ref 0–0.2)
BASOPHILS NFR BLD AUTO: 0 %
EOSINOPHIL # BLD AUTO: 0.1 X10(3)/MCL (ref 0–0.9)
EOSINOPHIL NFR BLD AUTO: 1 %
ERYTHROCYTE [DISTWIDTH] IN BLOOD BY AUTOMATED COUNT: 13 % (ref 11.5–14.5)
HCT VFR BLD AUTO: 43.3 % (ref 35–46)
HGB BLD-MCNC: 14 GM/DL (ref 12–16)
IMM GRANULOCYTES # BLD AUTO: 0.03 10*3/UL
IMM GRANULOCYTES NFR BLD AUTO: 0 %
LYMPHOCYTES # BLD AUTO: 2.1 X10(3)/MCL (ref 0.6–4.6)
LYMPHOCYTES NFR BLD AUTO: 24 %
MCH RBC QN AUTO: 29.8 PG (ref 26–34)
MCHC RBC AUTO-ENTMCNC: 32.3 GM/DL (ref 31–37)
MCV RBC AUTO: 92.1 FL (ref 80–100)
MONOCYTES # BLD AUTO: 0.6 X10(3)/MCL (ref 0.1–1.3)
MONOCYTES NFR BLD AUTO: 7 %
NEUTROPHILS # BLD AUTO: 5.89 X10(3)/MCL (ref 2.1–9.2)
NEUTROPHILS NFR BLD AUTO: 68 %
PLATELET # BLD AUTO: 217 X10(3)/MCL (ref 130–400)
PMV BLD AUTO: 9.6 FL (ref 7.4–10.4)
RBC # BLD AUTO: 4.7 X10(6)/MCL (ref 4–5.2)
T4 FREE SERPL-MCNC: 0.84 NG/DL (ref 0.76–1.46)
TSH SERPL-ACNC: 6.76 MIU/L (ref 0.36–3.74)
WBC # SPEC AUTO: 8.7 X10(3)/MCL (ref 4.5–11)

## 2020-07-01 ENCOUNTER — HISTORICAL (OUTPATIENT)
Dept: RADIATION THERAPY | Facility: HOSPITAL | Age: 65
End: 2020-07-01

## 2020-10-07 ENCOUNTER — HISTORICAL (OUTPATIENT)
Dept: ADMINISTRATIVE | Facility: HOSPITAL | Age: 65
End: 2020-10-07

## 2020-10-07 LAB
T4 FREE SERPL-MCNC: 0.89 NG/DL (ref 0.76–1.46)
TSH SERPL-ACNC: 10.91 MIU/L (ref 0.36–3.74)

## 2021-01-26 ENCOUNTER — HISTORICAL (OUTPATIENT)
Dept: RADIATION THERAPY | Facility: HOSPITAL | Age: 66
End: 2021-01-26

## 2021-05-12 ENCOUNTER — HISTORICAL (OUTPATIENT)
Dept: ADMINISTRATIVE | Facility: HOSPITAL | Age: 66
End: 2021-05-12

## 2021-05-12 LAB — (HCYS)2 SERPL-MCNC: 14.63 UMOL/L (ref 5.08–15.39)

## 2021-05-15 LAB — FINAL CULTURE: NORMAL

## 2021-08-17 ENCOUNTER — HISTORICAL (OUTPATIENT)
Dept: RADIATION THERAPY | Facility: HOSPITAL | Age: 66
End: 2021-08-17

## 2022-03-30 ENCOUNTER — HISTORICAL (OUTPATIENT)
Dept: RADIATION THERAPY | Facility: HOSPITAL | Age: 67
End: 2022-03-30

## 2022-04-10 ENCOUNTER — HISTORICAL (OUTPATIENT)
Dept: ADMINISTRATIVE | Facility: HOSPITAL | Age: 67
End: 2022-04-10

## 2022-04-27 VITALS
BODY MASS INDEX: 30.6 KG/M2 | WEIGHT: 162.06 LBS | HEIGHT: 61 IN | SYSTOLIC BLOOD PRESSURE: 143 MMHG | DIASTOLIC BLOOD PRESSURE: 83 MMHG

## 2022-04-30 NOTE — PROGRESS NOTES
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    HPI/Clinical History:   Past medical history: Bronchial asthma.  Coronary artery disease.  NIDDM.  Hypertension.  Obesity.  Obstructive sleep apnea.  Skin cancer.  ORIF of right knee.  Total hysterectomy for uterine cancer 20 years back; no adjuvant chemotherapy or radiation therapy.  Discectomy for intervertebral herniated disc in the neck in  or .  Cholecystectomy.  Arthroscopy of knee.  Social history: .  Has 7 children.  Lives in [place], [place].  Unemployed.  Disabled.  Smoked a pack of cigarettes daily for 15 years; stopped a few months back.  Denies alcohol or illicit drug abuse.  Family history: Father experienced throat cancer in his 60s; used to smoke.  Mother had breast cancer; age unknown.  Brother diagnosed with liver cancer with liver cirrhosis in his 40s.  Health maintenance: Mammogram earlier in 2018, apparently unremarkable; performed in [place].  Colonoscopy 5 years back in [place], apparently revealing a couple of benign but premalignant polyps.      63-year-old female.  Referred from tumor board for evaluation and management of squamous cell carcinoma of the lip status post bilateral neck dissection revealing extranodal extension of the tumor and  positive margin.    She has a history of a 2 cm x 2 cm lip squamous cell carcinoma [facility], with negative margins. No clinical or radiologic lymphadenopathy was noted at that time.  Subsequently developed submental and submandibular lymphadenopathy and was referred to [facility].  FNA of submental lymph node was positive for atypical squamous cells and multi nucleated giant cells.  Physical examination noted an approximately 1.5 cm right submandibular lymph node as well as approximately 1 cm submental lymph node.  Underwent bilateral levels 1-3 neck dissection on 10/01/2018.    --> Pathology: 06/04/2018: Right lower lip, vermilion border biopsy: Invasive well to moderately differentiated keratinizing squamous cell carcinoma.    07/13/2018: Right lower [facility] Well-differentiated invasive squamous cell carcinoma, all margins clear.    10/01/2018: Bilateral levels 1-3 neck dissection:  1.  Right level 1A: 1/4 lymph nodes positive, extranodal extension positive, cauterized margin positive:  2.  Right level 1B: 1/4 lymph nodes positive, extranodal extension present, largest lymph node 3 cm.  Comments: A total of 79 lymph nodes were examined in bilateral neck dissection, with 2 positive.    -> pT1 pN3b M0, stage IVB    10/18/2018: PET CT scan (staging: Ill-defined right cervical levels 1 and 2 soft tissue possibly necrotic lymphadenopathy but this demonstrates only low-grade FDG activity (maximum SUV 2.0); no evidence of metastasis.          Interval History   10/26/2018:  Presents for initial oncology consultation.  Very pleasant.  Says that until a few days back, she was feeling relatively well; she was up and about, taking care of her daily activities and was able to perform household chores.  However, for the last week, has felt increasingly weak.  Dizzy at times.  Still ambulatory.  Has not fallen.  Has not lost consciousness.  No focal neurological symptoms.  No  vision impairment.  Thinks that she is dehydrated.  When she eats or drinks something, she feels shocklike sensations on the right side of the mouth.  Also experiences pain in the jaw and inside of the mouth when eating.  Complains of headaches but, as mentioned above, without any focal neurological symptoms.  Feels nauseated.  Appetite is more or less preserved but she thinks that she can use some appetite stimulant.  Not yet supplementing with Ensure or boost.  Has [facility].  No fevers or chills.  No chest pain.  Mild to moderate exertional dyspnea.  No abdominal pain.  No vomiting but does feel nauseated.  No GI bleeding.  No cough or hemoptysis.  No otalgia.  Feels that the skin of the neck and upper chest feels tight.  No abnormal vaginal bleeding or discharge.      Histories   Past Medical History:    No active or resolved past medical history items have been selected or recorded.   Family History:    Heart disease  Mother  Diabetes mellitus type 2  Mother  Stroke  Sister  Throat cancer  Father  Aneurysm  Sister  Liver transplant recipient  Brother     Procedure history:    Neck Dissection (Bilateral) on 10/1/2018 at 63 Years.  Comments:  10/1/2018 18:58 - Michelle Travis  auto-populated from documented surgical case  Dental Extraction (None) on 10/1/2018 at 63 Years.  Comments:  10/1/2018 18:58 - Michelle Travis  auto-populated from documented surgical case  ORIF - Open reduction and internal fixation of fracture ([id number]).  Arthroscopy and biopsy of knee ([id number]).  ORIF - Open reduction and internal fixation of fracture ([id number]).  Discectomy for intervertebral herniated disc, nucleus pulposus ([id number]).  Cholecystectomy ([id number]).  skin cancer.  Hysterectomy and bilateral salpingo-oophorectomy sample ([id number]).   Social History        Social & Psychosocial Habits    Alcohol  09/13/2018  Use: Past    Substance Abuse  09/13/2018  Use: Never    Tobacco  09/13/2018  Use: Current every day  smoker    Type: Cigarettes.        Review of Systems   12 point review of systems done in full with pertinent positives as described in interval history. Remainder of review of systems unremarkable.      Health Status   Allergies:    Allergic Reactions (Selected)  Severity Not Documented  Aspirin- Black out.  Norco- Vomits.  Plavix- Headache.,    Allergies (3) Active Reaction  aspirin BLACK OUT  Norco VOMITS  Plavix HEADACHE   Current medications:  (Selected)   Prescriptions  Prescribed  Colace 100 mg oral capsule: 100 mg = 1 cap(s), Oral, BID, # 60 cap(s), 0 Refill(s), [facility] [place]  Zofran ODT 4 mg oral tablet, disintegratin mg = 1 tab(s), Oral, BID, PRN PRN nausea/vomiting, # 10 tab(s), 0 Refill(s), [facility] [place]  Documented Medications  Documented  AMLODIPINE   TAB 10MG: 10 mg = 1 tab(s), Oral, Daily  Atorvastatin 20 Mg Tablet: 20 mg = 1 tab(s), Oral, Daily  CLONIDINE    TAB 0.2M.2 mg = 1 tab(s), Oral, q4hr  JANUMET      TAB : 1 tab(s), Oral, BID  LOSARTAN POT TAB 100M mg = 1 tab(s), Oral, Daily  METOPROL SUC TAB 50MG ER: 50 mg = 1 tab(s), Oral, Daily  RANITIDINE   TAB 150M mg = 1 tab(s), Oral, BID  Ventolin HFA 90 mcg/inh inhalation aerosol: 2 puff(s), INH, q4hr, PRN PRN as needed for wheezing, # 18 gm, 0 Refill(s)   Problem list:    All Problems  Asthma / SNOMED CT 416831359 / Confirmed  Coronary artery disease / SNOMED CT 0152996646 / Confirmed  Diabetes mellitus / SNOMED CT 305176473 / Confirmed  Hypertension / SNOMED CT 42661506 / Confirmed  Impaired mobility / SNOMED CT 604233299 / Confirmed / Improving  Obesity / SNOMED CT 5082915023 / Probable  Obstructive sleep apnea of adult / SNOMED CT 8233180437 / Confirmed  Skin cancer / SNOMED CT 3314666770 / Confirmed  Tobacco user / SNOMED CT 518431594 / Confirmed,    Active Problems (9)  Asthma   Coronary artery disease   Diabetes mellitus   Hypertension   Impaired  mobility   Obesity   Obstructive sleep apnea of adult   Skin cancer   Tobacco user       Physical Examination   10/26/2018:  In no acute discomfort but does appear a bit weak.  Very pleasant.  Surgical incisions in the neck have healed very well, without any dehiscence, tenderness, erythema, or discharge.  No palpable lymphadenopathy or masses in the neck.   General:  Alert and oriented, No acute distress.    Eye:  Pupils are equal, round and reactive to light, Extraocular movements are intact, Normal conjunctiva.    HENT:  Normocephalic, Oral mucosa is moist, No pharyngeal erythema, No sinus tenderness.    Neck:  Supple, Non-tender, No jugular venous distention, No lymphadenopathy, No thyromegaly.    Respiratory:  Lungs are clear to auscultation, Respirations are non-labored, Breath sounds are equal, Symmetrical chest wall expansion, No chest wall tenderness.    Cardiovascular:  Normal rate, Regular rhythm, No murmur, No gallop.    Gastrointestinal:  Soft, Non-tender, Non-distended, No organomegaly.    Genitourinary:  No costovertebral angle tenderness, No inguinal tenderness.    Lymphatics:  No lymphadenopathy neck, axilla, groin.    Neurologic:  Alert, Oriented, Normal sensory, Normal motor function, No focal deficits.    Cognition and Speech:  Oriented, Speech clear and coherent, Functional cognition intact.    Psychiatric:  Cooperative, Appropriate mood & affect, Normal judgment, Non-suicidal.       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    Preferred chemotherapy concurrent with radiation therapy in this setting is high-dose cisplatin 100 mg/m² IV administered every 3 weeks.  However, given her declining performance status over the last week, from ECOG 1 down to ECOG 2, a feeling of persistent weakness and occasional dizziness, I will avoid high-dose cisplatin, and treat her with weekly Erbitux concurrent with radiation therapy instead.  Schedule:  1.  Day 1: Cetuximab 400 mg/m² loading dose over 2 hours, 1 week before radiation therapy, plus  2.  Day 7: Begin radiation therapy with 7 weekly infusions of cetuximab 250 mg/m²    Potential side effects of cetuximab include but are not limited to infusion reactions which can be serious; about 90% of severe infusion reactions upper with the first infusion despite premedication with antihistamines; may cause bronchospasm, stridor, hoarseness, hypotension, shock, loss of consciousness, myocardial infarction, cardiac arrest; cardiopulmonary arrest and/or sudden death which can occur in 2% of patients; pulmonary toxicity including interstitial lung disease; dermatologic toxicity including acneform rash, drying and fissuring of the skin, paronychial inflammation, infectious sequelae in the form of sepsis, abscess, cellulitis, conjunctivitis, blepharitis, keratitis, ulcerative keratitis, decreased visual acuity, cheilitis, hypotrichosis; acneform rash usually develops within the first 2 weeks of therapy and resolves in the majority of patients after cessation of treatment, although in nearly past, may continue beyond 28 days; life-threatening and fatal bolus mucocutaneous disease with blisters, erosions, sloughing of the skin; hypomagnesemia and electrolyte abnormalities;  hypomagnesemia occurs in 55% of patients; the most common adverse reactions are cutaneous adverse reactions including rash, pruritus, nail changes, headache, diarrhea, infection.    She has already consulted with radiation oncology.  Will have a PICC line placed for chemotherapy.  Check CBC and CMP weekly during chemoradiation therapy to monitor for side effects.  At this time, she does not appear overtly dehydrated.  However, will administer 500 cc of normal saline intravenously today.  Start Megace 800 mg p.o. daily as appetite stimulant.    Discussed above in detail with the patient.  All questions answered.  Discussed the potential side effects of cisplatin in detail and provided her with educational materials from Up[employer]To[employer]Date website.  Tentatively speaking, follow-up visit in 3 weeks.  She understands and agrees with this plan.

## 2022-04-30 NOTE — PROGRESS NOTES
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    TREATMENT HISTORY:  completed XRT 18 and received her 7th and final Erbitux on 18    HPI/Clinical History:   63-year-old female.  Referred from tumor board for evaluation and management of squamous cell carcinoma of the lip status post bilateral neck dissection revealing extranodal extension of the tumor and positive margin.    She has a history of a 2 cm x 2 cm lip squamous cell carcinoma that was excised at [facility] on 2018, with negative margins. No clinical or radiologic lymphadenopathy was noted at that time.  Subsequently developed submental and submandibular lymphadenopathy and was referred to [facility].  FNA of submental lymph node was positive for atypical squamous cells and multi nucleated giant cells.  Physical examination noted an approximately 1.5 cm right submandibular lymph node as well as approximately 1 cm submental lymph node.  Underwent bilateral levels 1-3 neck dissection on 10/01/2018.    --> Pathology: 2018: Right lower lip, vermilion border biopsy: Invasive well to moderately differentiated keratinizing squamous cell carcinoma.    2018: Right lower lip, lesion excision ([facility]): Well-differentiated invasive squamous cell carcinoma, all margins  clear.    10/01/2018: Bilateral levels 1-3 neck dissection:  1.  Right level 1A: 1/4 lymph nodes positive, extranodal extension positive, cauterized margin positive:  2.  Right level 1B: 1/4 lymph nodes positive, extranodal extension present, largest lymph node 3 cm.  Comments: A total of 79 lymph nodes were examined in bilateral neck dissection, with 2 positive.    -> pT1 pN3b M0, stage IVB    10/18/2018: PET CT scan (staging: Ill-defined right cervical levels 1 and 2 soft tissue possibly necrotic lymphadenopathy but this demonstrates only low-grade FDG activity (maximum SUV 2.0); no evidence of metastasis.     completed XRT 12/21/18 and received her 7th and final Erbitux on 12/19/18         Past medical history: Bronchial asthma.  Coronary artery disease.  NIDDM.  Hypertension.  Obesity.  Obstructive sleep apnea.  Skin cancer.  ORIF of right knee.  Total hysterectomy for uterine cancer 20 years back; no adjuvant chemotherapy or radiation therapy.  Discectomy for intervertebral herniated disc in the neck in 2005 or 2008.  Cholecystectomy.  Arthroscopy of knee.  Social history: .  Has 7 children.  Lives in [place], [place].  Unemployed.  Disabled.  Smoked a pack of cigarettes daily for 15 years; stopped a few months back.  Denies alcohol or illicit drug abuse.  Family history: Father experienced throat cancer in his 60s; used to smoke.  Mother had breast cancer; age unknown.  Brother diagnosed with liver cancer with liver cirrhosis in his 40s.  Health maintenance: Mammogram earlier in 2018, apparently unremarkable; performed in [place].  Colonoscopy 5 years back in [place], apparently revealing a couple of benign but premalignant polyps.         Interval History   12/26/2018:  Presents for follow up. she completed XRT last Friday (12/21/18) and received her 7th and final Erbitux on 12/19/18. She has hyplkalemia and hypomagnesemia today and is dehydrated. She has been getting home fluids three times a  week. Her main complaint is drainage of the skin of her neck.    No fevers or chills.  No chest pain.  Mild to moderate exertional dyspnea.  No abdominal pain.  No vomiting but does feel nauseated.  No GI bleeding.  No cough or hemoptysis.  No otalgia.  Feels that the skin of the neck and upper chest feels tight.  No abnormal vaginal bleeding or discharge.      Chief Complaint   12/24/2018 10:24 CST     quamous cell of lip      Histories   Past Medical History:    No active or resolved past medical history items have been selected or recorded.   Family History:    Heart disease  Mother  Diabetes mellitus type 2  Mother  Stroke  Sister  Throat cancer  Father  Aneurysm  Sister  Liver transplant recipient  Brother     Procedure history:    Neck Dissection (Bilateral) performed by Flavia Borja MD on 10/1/2018 at 63 Years.  Comments:  10/1/2018 18:58 - Michelle Travis  auto-populated from documented surgical case  Dental Extraction (None) performed by Flavia Borja MD on 10/1/2018 at 63 Years.  Comments:  10/1/2018 18:58 - Michelle Trvais  auto-populated from documented surgical case  ORIF - Open reduction and internal fixation of fracture (SNOMED CT 617234517).  Arthroscopy and biopsy of knee (SNOMED CT 197564353).  ORIF - Open reduction and internal fixation of fracture (SNOMED CT 933109712).  Discectomy for intervertebral herniated disc, nucleus pulposus (SNOMED CT 719061822).  Cholecystectomy (SNOMED CT 02304618).  skin cancer.  Hysterectomy and bilateral salpingo-oophorectomy sample (SNOMED CT 897035693).   Social History        Social & Psychosocial Habits    Alcohol  09/13/2018  Use: Past    Substance Abuse  09/13/2018  Use: Never    Tobacco  12/24/2018  Use: Former smoker    Patient Wants Consult For Cessation Counseling No.        Review of Systems   12 point review of systems done in full with pertinent positives as described in interval history. Remainder of review of systems unremarkable.      Health  Status   Allergies:    Allergic Reactions (Selected)  Severity Not Documented  Aspirin- Black out.  Norco- Vomits.  Plavix- Headache.   Current medications:  (Selected)   Outpatient Medications  Ordered  Heparin Flush 100 U/mL - 5 mL: 500 units, form: Injection, IV Push, Once-chemo, first dose 12/12/18 9:18:00 CST, stop date 12/12/18 9:18:00 CST, Routine, Weeks 6  Heparin Flush 100 U/mL - 5 mL: 500 units, form: Injection, IV Push, Once-chemo, first dose 12/19/18 10:43:00 CST, stop date 12/19/18 10:43:00 CST, Routine, Weeks 7  Future  Benadryl 50mg/ml Inj: 25 mg, form: Injection, IV Push, Once-chemo, Infuse over: 20 minute(s), *Est. first dose 12/26/18 10:43:00 CST, *Est. stop date 12/26/18 10:43:00 CST, Routine, Weeks 8, Future Order  Erbitux (for IVPB): 400 mg, form: Infusion, IV Piggyback, Once-chemo, Infuse over: 1 hr, *Est. first dose 12/26/18 10:43:00 CST, *Est. stop date 12/26/18 10:43:00 CST, Future Order, Weeks 8  Heparin Flush 100 U/mL - 5 mL: 500 units, form: Injection, IV Push, Once-chemo, *Est. first dose 12/26/18 10:43:00 CST, *Est. stop date 12/26/18 10:43:00 CST, Routine, Weeks 8, Future Order  Magnesium Sulfate 2gm/50ml IVPB (Premix): 2 gm, form: Infusion, IV Piggyback, Once, Infuse over: 1 hr, *Est. first dose 12/11/18 10:00:00 CST, *Est. stop date 12/11/18 10:00:00 CST, Future Order  magnesium sulfate (for IVPB) 2 gm + Potassium Chloride (for IVPB) - CCA 40 mEq + Sodium Chloride 0....: form: Infusion, IV Piggyback, Once-chemo, Infuse over: 2 hr, *Est. first dose 12/24/18 11:37:00 CST, *Est. stop date 12/24/18 11:37:00 CST, Routine, Days 1, Future Order  Prescriptions  Prescribed  Hydrocortisone 1% In Absorbase topical ointment: 1 blanche, TOP, QID, # 30 gm, 0 Refill(s), Pharmacy: [facility] [place]  Magic Mouthwash Combination: Magic Mouthwash Combination, See Instructions, Take 15mL (1 tablespoon) by mouth 4 times per day as needed for oral/throat pain, # 8 oz, 1 Refill(s), Pharmacy:  [place], [place]  Zofran ODT 4 mg oral tablet, disintegratin mg = 1 tab(s), Oral, BID, PRN PRN nausea/vomiting, # 10 tab(s), 0 Refill(s), Pharmacy: [place], [place]  magnesium oxide 400 mg (240 mg elemental magnesium) oral tablet: 400 mg = 1 tab(s), Oral, BID, # 60 tab(s), 1 Refill(s), Pharmacy: [place], [place]  Documented Medications  Documented  AMLODIPINE   TAB 10MG: 10 mg = 1 tab(s), Oral, Daily  AMOXICILLIN  TAB 875M mg = 1 tab(s), Oral, BID  Alprazolam Er 1 Mg Tablet:   Atorvastatin 20 Mg Tablet: 20 mg = 1 tab(s), Oral, Daily  CHLORHEX GLU SOL 0.12%: Oral, TID  LOSARTAN POT TAB 100M mg = 1 tab(s), Oral, Daily  METOPROL SUC TAB 50MG ER: 50 mg = 1 tab(s), Oral, Daily  OXYCOD/APAP  TAB 5-325M tab(s), Oral, q6hr  POLYETH GLYC POW 3350 NF: Oral, Daily  RANITIDINE   TAB 150M mg = 1 tab(s), Oral, BID  Tramadol Hcl 50 Mg Tablet:   Ventolin HFA 90 mcg/inh inhalation aerosol: 2 puff(s), INH, q4hr, PRN PRN as needed for wheezing, # 18 gm, 0 Refill(s)   Problem list:    All Problems  Acute renal insufficiency / SNOMED CT 8615917557 / Confirmed  Asthma / SNOMED CT 856116255 / Confirmed  Coronary artery disease / SNOMED CT 4370215177 / Confirmed  Diabetes mellitus / SNOMED CT 858069285 / Confirmed  Hypertension / SNOMED CT 32637685 / Confirmed  Impaired mobility / SNOMED CT 175258034 / Confirmed / Improving  Obesity / SNOMED CT 9890263108 / Probable  Obstructive sleep apnea of adult / SNOMED CT 0457100162 / Confirmed  Skin cancer / SNOMED CT 8583298479 / Confirmed  Tobacco user / SNOMED CT 525471500 / Confirmed,    Active Problems (10)  Acute renal insufficiency   Asthma   Coronary artery disease   Diabetes mellitus   Hypertension   Impaired mobility   Obesity   Obstructive sleep apnea of adult   Skin cancer   Tobacco user       Physical Examination   Vital Signs   2018 10:24 CST     Temperature Oral          36.9 DegC                              Temperature Oral (calculated)             98.42 DegF                             Peripheral Pulse Rate     85 bpm                             Respiratory Rate          18 br/min                             SpO2                      97 %                             Systolic Blood Pressure   151 mmHg  HI                             Diastolic Blood Pressure  85 mmHg     10/26/2018:  In no acute discomfort but does appear a bit weak.  Very pleasant.  Skin of neck with brisk radiation changes. No evidence of cellulitis.  Exam limited due to pain.  No palpable lymphadenopathy or masses in the neck.   General:  Alert and oriented, No acute distress.    Eye:  Pupils are equal, round and reactive to light, Extraocular movements are intact, Normal conjunctiva.    HENT:  Normocephalic, Oral mucosa is moist, No pharyngeal erythema, No sinus tenderness.    Neck:  Supple, Non-tender, No jugular venous distention, No lymphadenopathy, No thyromegaly.    Respiratory:  Lungs are clear to auscultation, Respirations are non-labored, Breath sounds are equal, Symmetrical chest wall expansion, No chest wall tenderness.    Cardiovascular:  Normal rate, Regular rhythm, No murmur, No gallop.    Gastrointestinal:  Soft, Non-tender, Non-distended, No organomegaly.    Genitourinary:  No costovertebral angle tenderness, No inguinal tenderness.    Lymphatics:  No lymphadenopathy neck, axilla, groin.    Neurologic:  Alert, Oriented, Normal sensory, Normal motor function, No focal deficits.    Cognition and Speech:  Oriented, Speech clear and coherent, Functional cognition intact.    Psychiatric:  Cooperative, Appropriate mood & affect, Normal judgment, Non-suicidal.       Review / Management   Results review:  Lab results   12/24/2018 9:04 CST      Sodium Lvl                137 mmol/L                             Potassium Lvl             2.7 mmol/L  CRIT                             Chloride                  101 mmol/L                              CO2                       29 mmol/L                             Calcium Lvl               8.2 mg/dL  LOW                             Magnesium Lvl             1.4 mg/dL  LOW                             Glucose Lvl               211 mg/dL  HI                             BUN                       2 mg/dL  LOW                             Creatinine                0.70 mg/dL                             eGFR-AA                   >105 mL/min                             eGFR-JESSICA                  89 mL/min  LOW                             Bili Total                0.6 mg/dL                             Bili Direct               0.3 mg/dL                             Bili Indirect             0.3 mg/dL                             AST                       19 unit/L                             ALT                       24 unit/L                             Alk Phos                  84 unit/L                             Total Protein             7.1 gm/dL                             Albumin Lvl               2.6 gm/dL  LOW                             Globulin                  4.50 gm/mL  HI                             A/G Ratio                 1 ratio                             WBC                       5.2 x10(3)/mcL                             RBC                       4.09 x10(6)/mcL                             Hgb                       11.6 gm/dL  LOW                             Hct                       36.1 %                             Platelet                  249 x10(3)/mcL                             MCV                       88.3 fL                             MCH                       28.4 pg                             MCHC                      32.1 gm/dL                             RDW                       13.7 %                             MPV                       9.6 fL                             Abs Neut                  3.84 x10(3)/mcL                             Neutro Auto               74  x10(3)/mcL  NA                             Lymph Auto                12 %  LOW                             Mono Auto                 11 %                             Eos Auto                  2  NA                             Abs Eos                   0.10 x10(3)/mcL  NA                             Basophil Auto             0  NA                             Abs Neutro                3.84 x10(3)/mcL  NA                             Abs Lymph                 0.60 x10(3)/mcL  NA                             Abs Mono                  0.57 x10(3)/mcL  NA                             Abs Baso                  0.02 x10(3)/mcL  NA                             IG%                       0 %  NA                             IG#                       0.0200  NA  .       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 Preferred chemotherapy concurrent with radiation therapy in this setting is high-dose cisplatin 100 mg/m² IV administered every 3 weeks.  However, given her declining performance status she was  treated with weekly Erbitux concurrent with radiation therapy instead. Completed therapy 12/18/18.      Plan:  She has completed adjuvant chemoradiation therapy in view of external extension of tumor and positive cauterized margins.  She is currently having difficulty with radiation changes to skin of neck and musoca. She is requiring IVF through home health.   She is hypokalemic and hypomagnesemic and dehydrated. Will give a liter of NS with K and Mg. RTC in 2 weeks. Repeat lab work on  12/26/18 with ongoing support from home health.

## 2022-05-03 NOTE — HISTORICAL [facility] CERNER
This is a historical note converted from [employer]. Formatting and pictures may have been removed.  Please reference [employer] for original formatting and attached multimedia. Chief Complaint  squamous cell arcinoma of lip; ecog=0, 6/10 pain in throat  ?  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  ?  Current Treatment:??  ?????1. ?Day 1: Cetuximab 400 mg/m??loading dose over 2 hours, 1 week before radiation therapy, plus  ?????2. ?Day 7: Begin radiation therapy with 7 weekly infusions of cetuximab 250 mg/m?Started weekly Erbitux 11/06/2018.  ?  History of present illness:  63-year-old female. ?Referred from tumor board for evaluation and management of squamous cell carcinoma of the lip status post bilateral neck dissection revealing extranodal extension of the tumor and positive margin.  ?  She has a history of a 2 cm x 2 cm lip squamous cell carcinoma that was excised at [facility] on 07/13/2018, with negative margins. No clinical or radiologic lymphadenopathy was noted at that time. ?Subsequently developed submental and submandibular lymphadenopathy and was referred to [facility]. ?FNA of submental lymph node was positive for atypical squamous cells and multi nucleated giant cells. ?Physical examination noted an approximately 1.5 cm right submandibular lymph node as well as approximately 1 cm submental lymph node. ?Underwent bilateral levels 1-3 neck dissection on 10/01/2018.  ?  --> Pathology: 06/04/2018: Right lower lip, vermilion border biopsy: Invasive well to moderately differentiated  keratinizing squamous cell carcinoma.  ?  07/13/2018: Right lower lip, lesion excision (LG): Well-differentiated invasive squamous cell carcinoma, all margins clear.  ?  10/01/2018: Bilateral levels 1-3 neck dissection:  1. ?Right level 1A: 1/4 lymph nodes positive, extranodal extension positive, cauterized margin positive:  2. ?Right level 1B: 1/4 lymph nodes positive, extranodal extension present, largest lymph node 3 cm.  Comments: A total of 79 lymph nodes were examined in bilateral neck dissection, with 2 positive.  ?  -> pT1 pN3b M0, stage IVB  ?  10/18/2018: PET CT scan (staging: Ill-defined right cervical levels 1 and 2 soft tissue possibly necrotic lymphadenopathy but this demonstrates only low-grade FDG activity (maximum SUV 2.0); no evidence of metastasis.?  ?  Interval History?  10/26/2018:  ?????Presents for initial oncology consultation. ?Very pleasant. ?Says that until a few days back, she was feeling relatively well; she was up and about, taking care of her daily activities and was able to perform household chores. ?However, for the last week, has felt increasingly weak. ?Dizzy at times. ?Still ambulatory. ?Has not fallen. ?Has not lost consciousness. ?No focal neurological symptoms. ?No vision impairment. ?Thinks that she is dehydrated. ?When she eats or drinks something, she feels shocklike sensations on the right side of the mouth. ?Also experiences pain in the jaw and inside of the mouth when eating. ?Complains of headaches but, as mentioned above, without any focal neurological symptoms. ?Feels nauseated. ?Appetite is more or less preserved but she thinks that she can use some appetite stimulant. ?Not yet supplementing with Ensure or boost. ?Has visited [facility].  ?????No fevers or chills. ?No chest pain. ?Mild to moderate exertional dyspnea. ?No abdominal pain. ?No vomiting but does feel nauseated. ?No GI bleeding. ?No cough or hemoptysis. ?No otalgia. ?Feels that the skin of the neck and  upper chest feels tight. ?No abnormal vaginal bleeding or discharge.  ?   11/19/2018:  Started weekly Erbitux 11/06/2018.  11/05/2018:?Brain MRI with and without contrast (headaches, syncope):?Right parietal bone subcentimeter?enhancing lesion may reflect a hemangioma?or metastasis?(PET/CT scan?on 10/18/2018 was negative for metastasis);?no scalp or intracranial extension.; ?No acute intracranial abnormalities; no brain metastasis.  Presents for follow-up visit.? Does not complain of headaches.? Says that she started radiation therapy a couple of weeks back.? Complaining of symptoms consistent with?oropharyngeal radiation mucositis. ?Using baking soda, without much relief.? No blood from mouth.? Has developed?itchy, painful?erythematous macular rash over the face, side effect of cetuximab.  ?   12/11/18: Patient presents for scheduled follow-up. ?Started?weekly?Erbitux with concurrent radiation. She is currently on week 6.? She has not been able?to eat?for the last several days.She has ongoing problems with oral intake and maintaining good nutrition and supportive care.? She is getting IV fluids by home health once or twice a week, however she says that there is some difficulty with the IV pump out.? She says she only got 300 cc 1 day and about 700 cc the next day.? Her creatinine today is 2.0.??She is on Janumet which will be stopped today. ?I have instructed her?to make an appointment with her PCP?to discuss changing to another agent, especially while she is on chemotherapy?and her oral intake?is significantly diminished. ?She has some mild erythema to her lips and anterior neck.? There is no desquamation.? She does have a acne type rash to her face and neck that is also mild, grade 1.? She has been prescribed?Megace, however?she does not like to take it.  ?  Review of Systems?  ?????12 point review of systems done in full with pertinent positives as described in interval history. Remainder of review of systems  unremarkable.??  ?   Physical Examination?  General: ?Alert and oriented, No acute distress.? However she does appear?fatigued?and weak  Eye: ?Pupils are equal, round and reactive to light, Extraocular movements are intact, Normal conjunctiva.??  HENT: ?Normocephalic, Oral mucosa is moist, No pharyngeal erythema, No sinus tenderness.??  Neck: ?Supple,-tender,?erythematous, no desquamation,?surgical incisions, well-healed,No palpable lymphadenopathy or masses in the neck.??????????  Respiratory: ?Lungs are clear to auscultation, Respirations are non-labored, Breath sounds are equal, Symmetrical chest wall expansion, No chest wall tenderness.??  Cardiovascular: ?Normal rate, Regular rhythm, No murmur, No gallop.??  Gastrointestinal: ?Soft, Non-tender, Non-distended, No organomegaly.??  Genitourinary: ?No costovertebral angle tenderness, No inguinal tenderness.??  Lymphatics: ?No lymphadenopathy neck, axilla, groin. ?  Integument:?Acne type rash to face and posterior neck secondary to Erbitux  Neurologic: ?Alert, Oriented, Normal sensory, Normal motor function, No focal deficits.??  Cognition and Speech: ?Oriented, Speech clear and coherent, Functional cognition intact.??  Psychiatric: ?Cooperative, Appropriate mood & affect, Normal judgment, Non-suicidal.??  ?   ?  Impression and Plan:  ??????  1. 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   2. ?Cetuximab-induced?facial?erythematous macular pruritic?painful skin rash.  ?  3. ?Oropharyngeal radiation mucositis.  ?  4. ?Renal insufficiency?due to decreased oral intake  IV fluids?1000 cc today with?a 2 g mag rider  ??????  Patient informed of the toxicity associated with treatment, which can be substantial and have a long term effect on quality of life. ?There is a large spectrum of side effects from chemotherapy and RT, most of those, unavoidable.?? We discussed the principles of maintaining good nutrition and supportive care to decrease toxicities as well as prevent long term sequelae as much as possible. ? ?Close monitoring of nutritional status will be pursued. ?She has been informed that treatment related toxicities can occur long after treatment is completed.?  ????????  Plan:  >? Continue chemoradiation therapy per protocol. ?Says that she will complete radiation next week  ?  >?needs adjuvant chemoradiation therapy in view of external extension of tumor and positive cauterized margins.  ??????  >?preferred chemotherapy concurrent with radiation therapy in this setting is high-dose cisplatin 100 mg/m??IV administered every 3 weeks. ?However, given her declining performance status over the last week, from ECOG 1 down to ECOG 2, a feeling of persistent weakness and occasional dizziness, I will avoid high-dose cisplatin, and treat her with weekly Erbitux concurrent with radiation therapy instead.  ?????Schedule:  ?????1. ?Day 1: Cetuximab 400 mg/m??loading dose over 2 hours, 1 week before radiation therapy, plus  ?????2. ?Day 7: Begin radiation therapy with 7 weekly infusions of cetuximab 250 mg/m?  ?  >?For relief of symptoms of?oropharyngeal radiation mucositis, start medical mouthwashes, 15-20 cc?swish?and gargle?and swallow or spit?every?3-4 hours.  ?  >?For cetuximab-induced?facial skin rash, start doxycycline?100 mg p.o.?every 12 hours for 1  month,?and 1% hydrocortisone?cream to be applied topically?2-3 times a day.  ?  ??????  Discussion:  Potential side effects of cetuximab include but are not limited to infusion reactions which can be serious; about 90% of severe infusion reactions upper with the first infusion despite premedication with antihistamines; may cause bronchospasm, stridor, hoarseness, hypotension, shock, loss of consciousness, myocardial infarction, cardiac arrest; cardiopulmonary arrest and/or sudden death which can occur in 2% of patients; pulmonary toxicity including interstitial lung disease; dermatologic toxicity including acneform rash, drying and fissuring of the skin, paronychial inflammation, infectious sequelae in the form of sepsis, abscess, cellulitis, conjunctivitis, blepharitis, keratitis, ulcerative keratitis, decreased visual acuity, cheilitis, hypotrichosis; acneform rash usually develops within the first 2 weeks of therapy and resolves in the majority of patients after cessation of treatment, although in nearly past, may continue beyond 28 days; life-threatening and fatal bolus mucocutaneous disease with blisters, erosions, sloughing of the skin; hypomagnesemia and electrolyte abnormalities; hypomagnesemia occurs in 55% of patients; the most common adverse reactions are cutaneous adverse reactions including rash, pruritus, nail changes, headache, diarrhea, infection.  ?   Problem List/Past Medical History  Ongoing  Asthma  Coronary artery disease  Diabetes mellitus  Hypertension  Obesity  Obstructive sleep apnea of adult  Skin cancer  Historical  No qualifying data  Procedure/Surgical History  Dental Extraction (None) (10/01/2018)  Neck Dissection (Bilateral) (10/01/2018)  Arthroscopy and biopsy of knee  Cholecystectomy  Discectomy for intervertebral herniated disc, nucleus pulposus  Hysterectomy and bilateral salpingo-oophorectomy sample  ORIF - Open reduction and internal fixation of fracture  ORIF - Open reduction  and internal fixation of fracture  skin cancer   Medications  Alprazolam Er 1 Mg Tablet,? ?Not Taking, Completed Rx  AMLODIPINE TAB 10MG, 10 mg= 1 tab(s), Oral, Daily  AMOXICILLIN TAB 875MG, 875 mg= 1 tab(s), Oral, BID,? ?Not Taking, Completed Rx  Atorvastatin 20 Mg Tablet, 20 mg= 1 tab(s), Oral, Daily  CHLORHEX GLU SOL 0.12%, Oral, TID,? ?Not taking  CLINDAMYCIN CAP 300MG, 300 mg= 1 cap(s), Oral, TID,? ?Not Taking, Completed Rx  CLONIDINE TAB 0.2MG, 0.2 mg= 1 tab(s), Oral, q4hr,? ?Not taking: to be taken PRN for SBP>160  Colace 100 mg oral capsule, 100 mg= 1 cap(s), Oral, BID,? ?Not taking  Hydrocortisone 1% In Absorbase topical ointment, 1 blanche, TOP, QID  JANUMET TAB , 1 tab(s), Oral, BID  LOSARTAN POT TAB 100MG, 100 mg= 1 tab(s), Oral, Daily  Magic Mouthwash Combination, See Instructions, 1 refills  magnesium oxide 400 mg (240 mg elemental magnesium) oral tablet, 400 mg= 1 tab(s), Oral, BID, 1 refills,? ?Not taking  Megace 40 mg/mL oral suspension, 800 mg= 20 mL, Oral, Daily,? ?Not taking  METHYLPRED TAB 4MG,? ?Not Taking, Completed Rx  METOPROL SUC TAB 50MG ER, 50 mg= 1 tab(s), Oral, Daily  OXYCOD/APAP TAB 5-325MG, 1 tab(s), Oral, q6hr  POLYETH GLYC POW 3350 NF, Oral, Daily,? ?Not taking  RANITIDINE TAB 150MG, 150 mg= 1 tab(s), Oral, BID  Tramadol Hcl 50 Mg Tablet,? ?Not Taking, Completed Rx  Ventolin HFA 90 mcg/inh inhalation aerosol, 2 puff(s), INH, q4hr, PRN  Zofran ODT 4 mg oral tablet, disintegrating, 4 mg= 1 tab(s), Oral, BID, PRN,? ?Not taking  Allergies  Norco?(VOMITS)  Plavix?(HEADACHE)  aspirin?(BLACK OUT)  Social History  Alcohol  Past, 09/13/2018  Substance Abuse  Never, 09/13/2018  Tobacco  Former smoker, No, Cigarettes, 12/04/2018  Former smoker, No, Cigarettes, 11/27/2018  Former smoker, N/A, 11/20/2018  Former smoker, No, 10/26/2018  Family History  Aneurysm: Sister.  Diabetes mellitus type 2: Mother.  Heart disease: Mother.  Liver transplant recipient: Brother.  Stroke: Sister.  Throat  cancer: Father.  Health Maintenance  Health Maintenance  ???Pending?(in the next year)  ??? ??Due?  ??? ? ? ?Alcohol Misuse Screening due??12/10/18??and every 1??year(s)  ??? ? ? ?Asthma Management-Asthma Education due??12/10/18??and every 6??month(s)  ??? ? ? ?Asthma Management-[place] Flow due??12/10/18??Variable frequency  ??? ? ? ?Asthma Management-Written Action Plan due??12/10/18??and every 6??month(s)  ??? ? ? ?Bone Density Screening due??12/10/18??Variable frequency  ??? ? ? ?Cognitive Screening due??12/10/18??and every 1??year(s)  ??? ? ? ?Colorectal Screening ([facility]) due??12/10/18??and every?  ??? ? ? ?Diabetes Maintenance-Eye Exam due??12/10/18??and every?  ??? ? ? ?Diabetes Maintenance-Foot Exam due??12/10/18??and every?  ??? ? ? ?Diabetes Maintenance-Medication Prescribed due??12/10/18??and every 1??year(s)  ??? ? ? ?Diabetes Maintenance-Microalbumin due??12/10/18??Variable frequency  ??? ? ? ?Diabetes Maintenance-Urine Dipstick due??12/10/18??Variable frequency  ??? ? ? ?Functional Assessment due??12/10/18??and every 1??year(s)  ??? ? ? ?Geriatric Depression Screening due??12/10/18??and every 1??year(s)  ??? ? ? ?Hypertension Management-Education due??12/10/18??and every 1??year(s)  ??? ? ? ?Lung Cancer Screening due??12/10/18??and every 1??year(s)  ??? ? ? ?Pneumococcal Vaccine due??12/10/18??Variable frequency  ??? ? ? ?Pneumococcal Vaccine due??12/10/18??and every?  ??? ? ? ?Tetanus Vaccine due??12/10/18??and every 10??year(s)  ??? ? ? ?Zoster Vaccine due??12/10/18??and every 100??year(s)  ??? ??Due In Future?  ??? ? ? ?Smoking Cessation not due until??04/03/19??and every 180??day(s)  ??? ? ? ?Diabetes Maintenance-Fasting Lipid Profile not due until??06/14/19??and every 1??year(s)  ??? ? ? ?Advance Directive not due until??09/13/19??and every 1??year(s)  ??? ? ? ?Diabetes Maintenance-HgbA1c not due until??09/25/19??and every 1??year(s)  ??? ? ? ?ADL Screening not due  until??10/03/19??and every 1??year(s)  ??? ? ? ?Asthma Management-Spirometry not due until??10/04/19??and every 1??year(s)  ??? ? ? ?Coronary Artery Disease Maintenance-Electrocardiogram not due until??10/05/19??and every 1??year(s)  ??? ? ? ?Hypertension Management-BMP not due until??12/04/19??and every 1??year(s)  ??? ? ? ?Hypertension Management-Blood Pressure not due until??12/04/19??and every 1??year(s)  ??? ? ? ?Coronary Artery Disease Maintenance-BMP not due until??12/04/19??and every 1??year(s)  ??? ? ? ?Diabetes Maintenance-Serum Creatinine not due until??12/04/19??and every 1??year(s)  ??? ? ? ?Fall Risk Assessment not due until??12/04/19??and every 1??year(s)  ??? ? ? ?Obesity Screening not due until??12/04/19??and every 1??year(s)  ???Satisfied?(in the past 1 year)  ??? ??Satisfied?  ??? ? ? ?ADL Screening on??10/03/18.??Satisfied by Carina English RN  ??? ? ? ?Advance Directive on??09/13/18.??Satisfied by Sushila Patel RN  ??? ? ? ?Asthma Management-Spirometry on??10/04/18.??Satisfied by Marbin Hampton RN  ??? ? ? ?Asthma Management-Asthma Medication Prescribed on??10/01/18.??Satisfied by Humera Grubbs  ??? ? ? ?Blood Pressure Screening on??12/04/18.??Satisfied by Jacinta Saez RN  ??? ? ? ?Body Mass Index Check on??12/04/18.??Satisfied by Jacinta Saez RN  ??? ? ? ?Coronary Artery Disease Maintenance-BMP on??12/04/18.??Satisfied by Shaina Sung  ??? ? ? ?Depression Screening on??11/19/18.??Satisfied by Jeana Cyr RN  ??? ? ? ?Diabetes Maintenance-Serum Creatinine on??12/04/18.??Satisfied by Shaina Sung  ??? ? ? ?Diabetes Maintenance-HgbA1c on??09/25/18.??Satisfied by Vaishali Santamaria  ??? ? ? ?Diabetes Screening on??12/04/18.??Satisfied by Shaina Sung  ??? ? ? ?Fall Risk Assessment on??12/04/18.??Satisfied by Jacinta Saez RN  ??? ? ? ?Hypertension Management-Blood Pressure on??12/04/18.??Satisfied by Jacinta Saez RN  ??? ? ?  ?Hypertension Management-BMP on??12/04/18.??Satisfied by Shaina Sung  ??? ? ? ?Influenza Vaccine on??11/27/18.??Satisfied by Frida Dave  ??? ? ? ?Obesity Screening on??12/04/18.??Satisfied by Jacinta Saez RN  ??? ? ? ?Smoking Cessation on??10/05/18.??Satisfied by Magen Clay RN  ??? ? ? ?Smoking Cessation (Coronary Artery Disease) on??10/05/18.??Satisfied by Magen Clay RN  ??? ? ? ?Smoking Cessation (Diabetes) on??10/05/18.??Satisfied by Magen Clay RN  ?  ?  Lab Results  Test Name Test Result Date/Time   Potassium Lvl 3.4 mmol/L (Low) 12/11/2018 07:54 CST   Chloride 102 mmol/L 12/11/2018 07:54 CST   CO2 26 mmol/L 12/11/2018 07:54 CST   Calcium Lvl 8.7 mg/dL 12/11/2018 07:54 CST   Magnesium Lvl 1.6 mg/dL (Low) 12/11/2018 07:54 CST   Glucose Lvl 186 mg/dL (High) 12/11/2018 07:54 CST   BUN 10 mg/dL 12/11/2018 07:54 CST   Creatinine 2.00 mg/dL (High) 12/11/2018 07:54 CST   eGFR-AA 32 mL/min (Low) 12/11/2018 07:54 CST   eGFR-JESSICA 27 mL/min (Low) 12/11/2018 07:54 CST   Bili Total 0.5 mg/dL 12/11/2018 07:54 CST   AST 29 unit/L 12/11/2018 07:54 CST   ALT 32 unit/L 12/11/2018 07:54 CST   Alk Phos 86 unit/L 12/11/2018 07:54 CST   Albumin Lvl 3.2 gm/dL (Low) 12/11/2018 07:54 CST   WBC 8.8 x10(3)/mcL 12/11/2018 07:54 CST   Hgb 12.2 gm/dL 12/11/2018 07:54 CST   Hct 38.8 % 12/11/2018 07:54 CST   Platelet 231 x10(3)/mcL 12/11/2018 07:54 CST   MCV 91.7 fL 12/11/2018 07:54 CST   Neutro Auto 82 x10(3)/mcL 12/11/2018 07:54 CST   Mono Auto 10 % 12/11/2018 07:54 CST      [1]?Office Visit Note; Shilpa PLASCENCIA, Ruben MCCRAY 11/19/2018 14:46 CST